# Patient Record
Sex: MALE | Race: WHITE | NOT HISPANIC OR LATINO | Employment: FULL TIME | URBAN - NONMETROPOLITAN AREA
[De-identification: names, ages, dates, MRNs, and addresses within clinical notes are randomized per-mention and may not be internally consistent; named-entity substitution may affect disease eponyms.]

---

## 2021-04-18 ENCOUNTER — HOSPITAL ENCOUNTER (EMERGENCY)
Facility: OTHER | Age: 51
Discharge: HOME | End: 2021-04-18
Attending: FAMILY MEDICINE | Admitting: FAMILY MEDICINE
Payer: COMMERCIAL

## 2021-04-18 VITALS
TEMPERATURE: 97.2 F | RESPIRATION RATE: 16 BRPM | SYSTOLIC BLOOD PRESSURE: 160 MMHG | DIASTOLIC BLOOD PRESSURE: 118 MMHG | HEIGHT: 71 IN | WEIGHT: 225 LBS | HEART RATE: 83 BPM | OXYGEN SATURATION: 98 % | BODY MASS INDEX: 31.5 KG/M2

## 2021-04-18 DIAGNOSIS — K08.89 PAIN, DENTAL: ICD-10-CM

## 2021-04-18 PROCEDURE — 99282 EMERGENCY DEPT VISIT SF MDM: CPT | Performed by: FAMILY MEDICINE

## 2021-04-18 PROCEDURE — 99283 EMERGENCY DEPT VISIT LOW MDM: CPT | Performed by: FAMILY MEDICINE

## 2021-04-18 RX ORDER — OXYCODONE AND ACETAMINOPHEN 5; 325 MG/1; MG/1
1-2 TABLET ORAL EVERY 4 HOURS PRN
Qty: 12 TABLET | Refills: 0 | Status: SHIPPED | OUTPATIENT
Start: 2021-04-18 | End: 2022-06-29

## 2021-04-18 RX ORDER — AMOXICILLIN 500 MG/1
500 CAPSULE ORAL 3 TIMES DAILY
Qty: 21 CAPSULE | Refills: 0 | Status: SHIPPED | OUTPATIENT
Start: 2021-04-18 | End: 2021-04-25

## 2021-04-18 ASSESSMENT — ENCOUNTER SYMPTOMS
CHILLS: 0
FEVER: 0

## 2021-04-18 ASSESSMENT — MIFFLIN-ST. JEOR: SCORE: 1892.72

## 2021-04-18 NOTE — ED TRIAGE NOTES
"ED Nursing Triage Note (General)   ________________________________    Alvaro MYRON Gaitan is a 50 year old Male that presents to triage private car, with history of dental pain to upper rt tooth.  reported by patient   Significant symptoms had onset x 2 days ago.   BP (!) 159/114   Pulse 77   Temp 97.2  F (36.2  C) (Tympanic)   Resp 16   Ht 1.803 m (5' 11\")   Wt 102.1 kg (225 lb)   SpO2 98%   BMI 31.38 kg/m  t  Patient appears alert  and oriented, in moderate distress., and cooperative and calm behavior.    GCS Total = 15  Airway: intact  Breathing noted as Normal.  Circulation Normal  Skin normal  Action taken:  Triage to critical care immediately      PRE HOSPITAL PRIOR LIVING SITUATION Spouse  "

## 2021-04-18 NOTE — ED PROVIDER NOTES
History     Chief Complaint   Patient presents with     Dental Pain     HPI  Alvaro Gaitan is a 50 year old male who -presents the ER with upper right dental pain.  Alvaro Gaitan is a 50 year old Male that presents to triage private car, with history of dental pain to upper rt tooth.  reported by patient   Significant symptoms had onset x 2 days ago.   Allergies:  Allergies   Allergen Reactions     Shellfish-Derived Products        Problem List:    There are no active problems to display for this patient.       Past Medical History:    Past Medical History:   Diagnosis Date     Atherosclerotic heart disease of native coronary artery without angina pectoris      Glaucoma      Hyperlipidemia      Male erectile dysfunction      Personal history of nicotine dependence        Past Surgical History:    Past Surgical History:   Procedure Laterality Date     OTHER SURGICAL HISTORY      [id number],OTHER     OTHER SURGICAL HISTORY      [id number],OTHER,  screws       Family History:    Family History   Problem Relation Age of Onset     Heart Disease Mother         Heart Disease     Heart Disease Father         Heart Disease     Hyperlipidemia Mother         Hyperlipidemia     Hyperlipidemia Father         Hyperlipidemia       Social History:  Marital Status:   [2]  Social History     Tobacco Use     Smoking status: Current Every Day Smoker     Packs/day: 0.50     Years: 25.00     Pack years: 12.50     Types: Cigarettes     Last attempt to quit: 2008     Years since quittin.1     Smokeless tobacco: Never Used     Tobacco comment: Quit smoking: Patient states that he smokes daily, but only one cigarettes   Substance Use Topics     Alcohol use: Yes     Alcohol/week: 8.3 standard drinks     Drug use: Unknown     Types: Other     Comment: Drug use: No        Medications:    amoxicillin (AMOXIL) 500 MG capsule  oxyCODONE-acetaminophen (PERCOCET) 5-325 MG tablet          Review of Systems   Constitutional: Negative for  "chills and fever.       Physical Exam   BP: (!) 159/114  Pulse: 77  Temp: 97.2  F (36.2  C)  Resp: 16  Height: 180.3 cm (5' 11\")  Weight: 102.1 kg (225 lb)  SpO2: 98 %      Physical Exam  Vitals signs and nursing note reviewed.   Constitutional:       Appearance: Normal appearance.   HENT:      Head:      Jaw: No trismus, swelling or malocclusion.   Neurological:      Mental Status: He is alert.     Very poor dentition    ED Course        Procedures    No results found for this or any previous visit (from the past 24 hour(s)).    Medications - No data to display    Assessments & Plan (with Medical Decision Making)     I have reviewed the nursing notes.    I have reviewed the findings, diagnosis, plan and need for follow up with the patient.    New Prescriptions    AMOXICILLIN (AMOXIL) 500 MG CAPSULE    Take 1 capsule (500 mg) by mouth 3 times daily for 7 days    OXYCODONE-ACETAMINOPHEN (PERCOCET) 5-325 MG TABLET    Take 1-2 tablets by mouth every 4 hours as needed for pain       Final diagnoses:   Pain, dental   Follow-up with dental ASAP    4[facility]18/2021   [facility]  04/18/21 1512    "

## 2021-04-18 NOTE — DISCHARGE INSTRUCTIONS
Please consider getting vaccinated to COVID-19 as soon as possible.  There are always small risks to any intervention or medication including the antibiotics and pain medications that you are receiving today.  As always however about weighing risk and benefit.  The benefit of receiving the COVID-19 vaccine is well-established to be far greater than the risk.  COVID-19 is a severe illness and although is often mild in many people I have personally witnessed severe disease and death in your age group.  If you are comfortable taking medications such as amoxicillin and Percocet both of which can cause severe side effects potentially I recommend being comfortable receiving the vaccine.

## 2021-11-22 ENCOUNTER — ALLIED HEALTH/NURSE VISIT (OUTPATIENT)
Dept: FAMILY MEDICINE | Facility: OTHER | Age: 51
End: 2021-11-22
Attending: FAMILY MEDICINE
Payer: COMMERCIAL

## 2021-11-22 DIAGNOSIS — R05.9 COUGH: Primary | ICD-10-CM

## 2021-11-22 DIAGNOSIS — R51.9 HEAD ACHE: ICD-10-CM

## 2021-11-22 PROCEDURE — U0003 INFECTIOUS AGENT DETECTION BY NUCLEIC ACID (DNA OR RNA); SEVERE ACUTE RESPIRATORY SYNDROME CORONAVIRUS 2 (SARS-COV-2) (CORONAVIRUS DISEASE [COVID-19]), AMPLIFIED PROBE TECHNIQUE, MAKING USE OF HIGH THROUGHPUT TECHNOLOGIES AS DESCRIBED BY CMS-2020-01-R: HCPCS | Mod: ZL

## 2021-11-22 PROCEDURE — C9803 HOPD COVID-19 SPEC COLLECT: HCPCS

## 2021-11-23 LAB — SARS-COV-2 RNA RESP QL NAA+PROBE: NEGATIVE

## 2021-12-04 ENCOUNTER — HEALTH MAINTENANCE LETTER (OUTPATIENT)
Age: 51
End: 2021-12-04

## 2022-06-29 ENCOUNTER — HOSPITAL ENCOUNTER (EMERGENCY)
Facility: OTHER | Age: 52
Discharge: HOME | End: 2022-06-29
Attending: PHYSICIAN ASSISTANT | Admitting: PHYSICIAN ASSISTANT
Payer: COMMERCIAL

## 2022-06-29 ENCOUNTER — APPOINTMENT (OUTPATIENT)
Dept: GENERAL RADIOLOGY | Facility: OTHER | Age: 52
End: 2022-06-29
Attending: PHYSICIAN ASSISTANT
Payer: COMMERCIAL

## 2022-06-29 VITALS
RESPIRATION RATE: 14 BRPM | DIASTOLIC BLOOD PRESSURE: 75 MMHG | HEIGHT: 71 IN | OXYGEN SATURATION: 94 % | TEMPERATURE: 100.8 F | WEIGHT: 230 LBS | SYSTOLIC BLOOD PRESSURE: 113 MMHG | BODY MASS INDEX: 32.2 KG/M2 | HEART RATE: 82 BPM

## 2022-06-29 DIAGNOSIS — R50.9 FEVER: ICD-10-CM

## 2022-06-29 DIAGNOSIS — R31.1 BENIGN ESSENTIAL MICROSCOPIC HEMATURIA: ICD-10-CM

## 2022-06-29 DIAGNOSIS — D69.6 THROMBOCYTOPENIA (H): ICD-10-CM

## 2022-06-29 LAB
ALBUMIN SERPL-MCNC: 4.1 G/DL (ref 3.5–5.7)
ALBUMIN UR-MCNC: 10 MG/DL
ALP SERPL-CCNC: 99 U/L (ref 34–104)
ALT SERPL W P-5'-P-CCNC: 33 U/L (ref 7–52)
ANION GAP SERPL CALCULATED.3IONS-SCNC: 8 MMOL/L (ref 3–14)
APPEARANCE UR: CLEAR
AST SERPL W P-5'-P-CCNC: 34 U/L (ref 13–39)
BASOPHILS # BLD MANUAL: 0 10E3/UL (ref 0–0.2)
BASOPHILS NFR BLD MANUAL: 0 %
BILIRUB SERPL-MCNC: 0.5 MG/DL (ref 0.3–1)
BILIRUB UR QL STRIP: NEGATIVE
BUN SERPL-MCNC: 6 MG/DL (ref 7–25)
CALCIUM SERPL-MCNC: 9.2 MG/DL (ref 8.6–10.3)
CHLORIDE BLD-SCNC: 100 MMOL/L (ref 98–107)
CO2 SERPL-SCNC: 26 MMOL/L (ref 21–31)
COLOR UR AUTO: YELLOW
CREAT SERPL-MCNC: 0.87 MG/DL (ref 0.7–1.3)
EOSINOPHIL # BLD MANUAL: 0 10E3/UL (ref 0–0.7)
EOSINOPHIL NFR BLD MANUAL: 0 %
ERYTHROCYTE [DISTWIDTH] IN BLOOD BY AUTOMATED COUNT: 13.7 % (ref 10–15)
FLUAV RNA SPEC QL NAA+PROBE: NEGATIVE
FLUBV RNA RESP QL NAA+PROBE: NEGATIVE
GFR SERPL CREATININE-BSD FRML MDRD: >90 ML/MIN/1.73M2
GLUCOSE BLD-MCNC: 96 MG/DL (ref 70–105)
GLUCOSE UR STRIP-MCNC: NEGATIVE MG/DL
GROUP A STREP BY PCR: NOT DETECTED
HCT VFR BLD AUTO: 48.3 % (ref 40–53)
HGB BLD-MCNC: 16.7 G/DL (ref 13.3–17.7)
HGB UR QL STRIP: ABNORMAL
KETONES UR STRIP-MCNC: NEGATIVE MG/DL
LACTATE SERPL-SCNC: 1.6 MMOL/L (ref 0.7–2)
LEUKOCYTE ESTERASE UR QL STRIP: NEGATIVE
LIPASE SERPL-CCNC: 18 U/L (ref 11–82)
LYMPHOCYTES # BLD MANUAL: 0.7 10E3/UL (ref 0.8–5.3)
LYMPHOCYTES NFR BLD MANUAL: 16 %
MAGNESIUM SERPL-MCNC: 1.7 MG/DL (ref 1.9–2.7)
MCH RBC QN AUTO: 31.2 PG (ref 26.5–33)
MCHC RBC AUTO-ENTMCNC: 34.6 G/DL (ref 31.5–36.5)
MCV RBC AUTO: 90 FL (ref 78–100)
MONOCYTES # BLD MANUAL: 0.2 10E3/UL (ref 0–1.3)
MONOCYTES NFR BLD MANUAL: 4 %
MUCOUS THREADS #/AREA URNS LPF: PRESENT /LPF
NEUTROPHILS # BLD MANUAL: 3.6 10E3/UL (ref 1.6–8.3)
NEUTROPHILS NFR BLD MANUAL: 80 %
NITRATE UR QL: NEGATIVE
PH UR STRIP: 6 [PH] (ref 5–9)
PLAT MORPH BLD: ABNORMAL
PLATELET # BLD AUTO: 134 10E3/UL (ref 150–450)
POTASSIUM BLD-SCNC: 3.9 MMOL/L (ref 3.5–5.1)
PROT SERPL-MCNC: 6.9 G/DL (ref 6.4–8.9)
RBC # BLD AUTO: 5.36 10E6/UL (ref 4.4–5.9)
RBC MORPH BLD: ABNORMAL
RBC URINE: 4 /HPF
RSV RNA SPEC NAA+PROBE: NEGATIVE
SARS-COV-2 RNA RESP QL NAA+PROBE: NEGATIVE
SODIUM SERPL-SCNC: 134 MMOL/L (ref 134–144)
SP GR UR STRIP: 1.02 (ref 1–1.03)
TSH SERPL DL<=0.005 MIU/L-ACNC: 1.74 MU/L (ref 0.4–4)
UROBILINOGEN UR STRIP-MCNC: 4 MG/DL
WBC # BLD AUTO: 4.5 10E3/UL (ref 4–11)
WBC URINE: 1 /HPF

## 2022-06-29 PROCEDURE — 87637 SARSCOV2&INF A&B&RSV AMP PRB: CPT | Performed by: PHYSICIAN ASSISTANT

## 2022-06-29 PROCEDURE — 85007 BL SMEAR W/DIFF WBC COUNT: CPT | Performed by: PHYSICIAN ASSISTANT

## 2022-06-29 PROCEDURE — 96365 THER/PROPH/DIAG IV INF INIT: CPT | Performed by: PHYSICIAN ASSISTANT

## 2022-06-29 PROCEDURE — 83605 ASSAY OF LACTIC ACID: CPT | Performed by: PHYSICIAN ASSISTANT

## 2022-06-29 PROCEDURE — 87798 DETECT AGENT NOS DNA AMP: CPT | Performed by: PHYSICIAN ASSISTANT

## 2022-06-29 PROCEDURE — 71045 X-RAY EXAM CHEST 1 VIEW: CPT

## 2022-06-29 PROCEDURE — 83690 ASSAY OF LIPASE: CPT | Performed by: PHYSICIAN ASSISTANT

## 2022-06-29 PROCEDURE — 82310 ASSAY OF CALCIUM: CPT | Performed by: PHYSICIAN ASSISTANT

## 2022-06-29 PROCEDURE — 85027 COMPLETE CBC AUTOMATED: CPT | Performed by: PHYSICIAN ASSISTANT

## 2022-06-29 PROCEDURE — 86618 LYME DISEASE ANTIBODY: CPT | Performed by: PHYSICIAN ASSISTANT

## 2022-06-29 PROCEDURE — 250N000011 HC RX IP 250 OP 636: Performed by: PHYSICIAN ASSISTANT

## 2022-06-29 PROCEDURE — 250N000013 HC RX MED GY IP 250 OP 250 PS 637: Performed by: PHYSICIAN ASSISTANT

## 2022-06-29 PROCEDURE — C9803 HOPD COVID-19 SPEC COLLECT: HCPCS | Performed by: PHYSICIAN ASSISTANT

## 2022-06-29 PROCEDURE — 36415 COLL VENOUS BLD VENIPUNCTURE: CPT | Performed by: PHYSICIAN ASSISTANT

## 2022-06-29 PROCEDURE — 84443 ASSAY THYROID STIM HORMONE: CPT | Performed by: PHYSICIAN ASSISTANT

## 2022-06-29 PROCEDURE — 99284 EMERGENCY DEPT VISIT MOD MDM: CPT | Mod: CS | Performed by: PHYSICIAN ASSISTANT

## 2022-06-29 PROCEDURE — 87651 STREP A DNA AMP PROBE: CPT | Performed by: PHYSICIAN ASSISTANT

## 2022-06-29 PROCEDURE — 87040 BLOOD CULTURE FOR BACTERIA: CPT | Performed by: PHYSICIAN ASSISTANT

## 2022-06-29 PROCEDURE — 99285 EMERGENCY DEPT VISIT HI MDM: CPT | Mod: 25,CS | Performed by: PHYSICIAN ASSISTANT

## 2022-06-29 PROCEDURE — 258N000003 HC RX IP 258 OP 636: Performed by: PHYSICIAN ASSISTANT

## 2022-06-29 PROCEDURE — 81001 URINALYSIS AUTO W/SCOPE: CPT | Performed by: PHYSICIAN ASSISTANT

## 2022-06-29 PROCEDURE — 83735 ASSAY OF MAGNESIUM: CPT | Performed by: PHYSICIAN ASSISTANT

## 2022-06-29 RX ORDER — NITROGLYCERIN 0.4 MG/1
TABLET SUBLINGUAL
COMMUNITY
Start: 2022-06-27 | End: 2023-09-18

## 2022-06-29 RX ORDER — ISOSORBIDE MONONITRATE 60 MG/1
60 TABLET, EXTENDED RELEASE ORAL DAILY
COMMUNITY
Start: 2022-06-21 | End: 2023-09-18

## 2022-06-29 RX ORDER — ATORVASTATIN CALCIUM 40 MG/1
40 TABLET, FILM COATED ORAL DAILY
COMMUNITY
Start: 2022-06-21 | End: 2023-09-18

## 2022-06-29 RX ORDER — DOXYCYCLINE 100 MG/10ML
100 INJECTION, POWDER, LYOPHILIZED, FOR SOLUTION INTRAVENOUS EVERY 12 HOURS
Status: DISCONTINUED | OUTPATIENT
Start: 2022-06-29 | End: 2022-06-30 | Stop reason: HOSPADM

## 2022-06-29 RX ORDER — ASPIRIN 81 MG/1
81 TABLET, COATED ORAL DAILY
COMMUNITY
Start: 2022-06-21 | End: 2023-09-18

## 2022-06-29 RX ORDER — LISINOPRIL 2.5 MG/1
2.5 TABLET ORAL DAILY
COMMUNITY
Start: 2022-06-21 | End: 2023-09-18

## 2022-06-29 RX ORDER — ACETAMINOPHEN 500 MG
1000 TABLET ORAL ONCE
Status: COMPLETED | OUTPATIENT
Start: 2022-06-29 | End: 2022-06-29

## 2022-06-29 RX ORDER — DOXYCYCLINE 100 MG/1
100 CAPSULE ORAL 2 TIMES DAILY
Qty: 28 CAPSULE | Refills: 0 | Status: SHIPPED | OUTPATIENT
Start: 2022-06-29 | End: 2023-11-16

## 2022-06-29 RX ORDER — CLOPIDOGREL BISULFATE 75 MG/1
75 TABLET ORAL DAILY
COMMUNITY
Start: 2022-06-21 | End: 2023-09-18

## 2022-06-29 RX ADMIN — SODIUM CHLORIDE 1000 ML: 9 INJECTION, SOLUTION INTRAVENOUS at 20:50

## 2022-06-29 RX ADMIN — DOXYCYCLINE 100 MG: 100 INJECTION, POWDER, LYOPHILIZED, FOR SOLUTION INTRAVENOUS at 22:47

## 2022-06-29 RX ADMIN — ACETAMINOPHEN 1000 MG: 500 TABLET ORAL at 20:50

## 2022-06-29 ASSESSMENT — ENCOUNTER SYMPTOMS
FATIGUE: 1
VOMITING: 0
HEADACHES: 1
ABDOMINAL PAIN: 0
FEVER: 1
SORE THROAT: 1
ARTHRALGIAS: 1
SINUS PAIN: 0
CONFUSION: 0
EYE PAIN: 0
HEMATURIA: 0
NAUSEA: 0
DYSURIA: 0
NECK PAIN: 0
BACK PAIN: 1
CHILLS: 1
BRUISES/BLEEDS EASILY: 0
SHORTNESS OF BREATH: 0
LIGHT-HEADEDNESS: 0
COUGH: 0
DIARRHEA: 0

## 2022-06-29 NOTE — ED TRIAGE NOTES
Pt comes into the ER today with his wife. Pt reports that he is shaking and sweating off and on since yesterday. He doesn't feel well has lower back pain, right knee pain. Reports no cough no shortness of breath, no nausea vomiting diarrhea. Reports no urinary problems. Intake has been ok. May have had a tick bite about a week ago down in [place]. No rash.  Pt has some nasal congestion and a scratchy throat.      Triage Assessment     Row Name 06/29/22 0454       Triage Assessment (Adult)    Airway WDL WDL       Respiratory WDL    Respiratory WDL WDL       Skin Circulation/Temperature WDL    Skin Circulation/Temperature WDL WDL       Cardiac WDL    Cardiac WDL WDL       Peripheral/Neurovascular WDL    Peripheral Neurovascular WDL WDL    Capillary Refill, General less than/equal to 3 secs       [place] Coma Scale    Best Eye Response 4-->(E4) spontaneous    Best Motor Response 6-->(M6) obeys commands    Best Verbal Response 5-->(V5) oriented    [place] Coma Scale Score 15

## 2022-06-30 LAB — B BURGDOR IGG+IGM SER QL: 0.45

## 2022-06-30 NOTE — DISCHARGE INSTRUCTIONS
-Unknown to as what is causing your symptoms.  We will treat you for Lyme disease until Lyme disease test results.  This will take doxycycline 100 mg with food every 12 hours for 14 days.  If Lyme disease test comes back negative, you may discontinue doxycycline.    -I would recommend following up with your primary care doctor in about 1 week to retest your urine for blood.  If this is a viral illness, this hematuria should resolve.  There is no signs of bladder infection on today's exam.  If you have continuing back pain and hematuria, would recommend following up with your primary care doctor for CT scan of your abdomen and pelvis, and I was also recommend following up with urology to further investigate the blood in your urine.

## 2022-06-30 NOTE — ED PROVIDER NOTES
EMERGENCY DEPARTMENT ENCOUNTER      NAME: Alvaro Gaitan  AGE: 51 year old male  YOB: 1970  MRN: [id number]  EVALUATION DATE & TIME: 6/29/2022  7:44 PM    PCP: No Ref-Primary, Physician    ED PROVIDER: Andrew Kumar PA-C      CHIEF COMPLAINT:  Chief Complaint   Patient presents with     Chills     Sweats         FINAL IMPRESSION:  1. Fever    2. Thrombocytopenia (H)    3. Benign essential microscopic hematuria        ED COURSE, MEDICAL DECISION MAKING, ASSESSMENT, AND PLAN:    Pertinent Labs & Imaging studies reviewed. (See chart for details)  Results for orders placed or performed during the hospital encounter of 06/29/22   XR Chest Port 1 View    Impression    IMPRESSION: Clear chest    DAQUAN STEVENSON MD         SYSTEM ID:  [id number]   Comprehensive metabolic panel   Result Value Ref Range    Sodium 134 134 - 144 mmol/L    Potassium 3.9 3.5 - 5.1 mmol/L    Chloride 100 98 - 107 mmol/L    Carbon Dioxide (CO2) 26 21 - 31 mmol/L    Anion Gap 8 3 - 14 mmol/L    Urea Nitrogen 6 (L) 7 - 25 mg/dL    Creatinine 0.87 0.70 - 1.30 mg/dL    Calcium 9.2 8.6 - 10.3 mg/dL    Glucose 96 70 - 105 mg/dL    Alkaline Phosphatase 99 34 - 104 U/L    AST 34 13 - 39 U/L    ALT 33 7 - 52 U/L    Protein Total 6.9 6.4 - 8.9 g/dL    Albumin 4.1 3.5 - 5.7 g/dL    Bilirubin Total 0.5 0.3 - 1.0 mg/dL    GFR Estimate >90 >60 mL/min/1.73m2   Lactic acid whole blood   Result Value Ref Range    Lactic Acid 1.6 0.7 - 2.0 mmol/L   Result Value Ref Range    Lipase 18 11 - 82 U/L   Result Value Ref Range    Magnesium 1.7 (L) 1.9 - 2.7 mg/dL   Thyrotropin GH   Result Value Ref Range    TSH 1.74 0.40 - 4.00 mU/L   UA with Microscopic reflex to Culture    Specimen: Urine, Clean Catch   Result Value Ref Range    Color Urine Yellow Colorless, Straw, Light Yellow, Yellow    Appearance Urine Clear Clear    Glucose Urine Negative Negative mg/dL    Bilirubin Urine Negative Negative    Ketones Urine Negative Negative mg/dL     "Specific Gravity Urine 1.020 1.000 - 1.030    Blood Urine Small (A) Negative    pH Urine 6.0 5.0 - 9.0    Protein Albumin Urine 10  (A) Negative mg/dL    Urobilinogen Urine 4.0 (A) Normal, 2.0 mg/dL    Nitrite Urine Negative Negative    Leukocyte Esterase Urine Negative Negative    Mucus Urine Present (A) None Seen /LPF    RBC Urine 4 (H) <=2 /HPF    WBC Urine 1 <=5 /HPF   Asymptomatic Influenza A/B & SARS-CoV2 (COVID-19) Virus PCR Multiplex Nose    Specimen: Nose; Swab   Result Value Ref Range    Influenza A PCR Negative Negative    Influenza B PCR Negative Negative    RSV PCR Negative Negative    SARS CoV2 PCR Negative Negative   CBC with platelets and differential   Result Value Ref Range    WBC Count 4.5 4.0 - 11.0 10e3/uL    RBC Count 5.36 4.40 - 5.90 10e6/uL    Hemoglobin 16.7 13.3 - 17.7 g/dL    Hematocrit 48.3 40.0 - 53.0 %    MCV 90 78 - 100 fL    MCH 31.2 26.5 - 33.0 pg    MCHC 34.6 31.5 - 36.5 g/dL    RDW 13.7 10.0 - 15.0 %    Platelet Count 134 (L) 150 - 450 10e3/uL   Manual Differential   Result Value Ref Range    % Neutrophils 80 %    % Lymphocytes 16 %    % Monocytes 4 %    % Eosinophils 0 %    % Basophils 0 %    Absolute Neutrophils 3.6 1.6 - 8.3 10e3/uL    Absolute Lymphocytes 0.7 (L) 0.8 - 5.3 10e3/uL    Absolute Monocytes 0.2 0.0 - 1.3 10e3/uL    Absolute Eosinophils 0.0 0.0 - 0.7 10e3/uL    Absolute Basophils 0.0 0.0 - 0.2 10e3/uL    RBC Morphology Confirmed RBC Indices     Platelet Assessment  Automated Count Confirmed. Platelet morphology is normal.     Automated Count Confirmed. Platelet morphology is normal.   Group A Streptococcus PCR Throat Swab    Specimen: Throat; Swab   Result Value Ref Range    Group A strep by PCR Not Detected Not Detected     No results found for: ABORH      The patient was interviewed and examined. History in the chart was reviewed.  Vitals and triage note were reviewed /58   Pulse 91   Temp (!) 100.8  F (38.2  C) (Tympanic)   Resp 14   Ht 1.803 m (5' 11\")  "  Wt 104.3 kg (230 lb)   SpO2 96%   BMI 32.08 kg/m      PPE worn during patient evaluation:  Mask: Yes, surgical  Eye Protection: No  Gown: No  Hair cover: No  Face Shield: No  Patient wearing a mask: yes    General History/Differential Dx:  Alvaro Gaitan is a pleasant 51 [facility].    Patient has been having fever and chills especially at night over the past 2 to 3 days.  Patient has been chilled so much that he has been shaking a lot.  States that he does have some mild congestion, headache, stuffy nose, and scratchy throat.  He states that he has been exposed to tick bites.  Denies any history of Lyme disease.  Patient having some minimal lower back pain and right knee pain but denies any urinary symptoms.  No constipation diarrhea.  No bloody or melanotic stools.  No joint swelling.  No rashes, erythema, or warmth of any of the upper or lower extremities.  No chest pain, shortness of breath, or dyspnea.    Vitals and Exam:  Patient was febrile temperature 100.8.  Patient's not tachycardic or hypoxic.  Patient was otherwise hemodynamically stable.  Patient was ill-appearing but not toxic.  He was alert and orientated.  Patient was in no acute distress.  HEENT today was negative.  Lungs were clear.  Heart regular without murmurs, gallops, rubs.  No meningeal signs.  No abdominal, flank, or CVA tenderness.  No back tenderness.  No tenderness of the upper or lower extremities.  No signs of septic joint or cellulitis of the upper or lower extremities.  Exam today was otherwise benign.  Blood cultures pending.  Anaplasmosis test pending.  Lyme disease test pending    Labs, EKG, and Imaging:  Platelet count 134.  Magnesium 1.7.  UA shows hematuria without signs of infection.  No leukocytosis or neutrophilia.  Lactic acid was normal.  No renal dysfunction.  No hepatic dysfunction.  Magnesium 1.7, otherwise no electrolyte abnormalities.  Chest x-ray  negative.  CT abdomen pelvis was offered but patient declined at this visit as he was feeling better after IV fluids and Tylenol.    Medications, Interventions, Reassessments, & Response to Treatments:  Patient given 1 L of normal saline and oral Tylenol 1000 mg with patient feeling better.  Patient was given IV doxycycline 100 mg to cover for possible Lyme disease.    Overall Impression/Treatment Plan/Discharge Info/Follow-up/Medical Necessity for Admission:  Fever, thrombocytopenia, and hematuria  -Unknown to as the cause.  Chest x-ray negative with no signs of pneumonia.  No meningeal signs to suggest meningitis.  No leukocytosis.  UA shows hematuria without signs of infection.  Patient did have thrombocytopenia.  -Broad differential considered.  Lyme disease still possible which would explain thrombocytopenia.  We will start patient on doxycycline 100 mg every 12 hours for 14 days until Lyme disease test results.  Blood cultures and anaplasmosis smear still pending.  Patient was complaining of some mild lower back pain with noted hematuria.  No signs of UTI to suggest pyelonephritis.  Patient was offered CT at this point but declined and states that he would follow-up with his primary care doctor and if he continues to have hematuria, will do CT as an outpatient.  Patient states that he would also return to the ER for severe worsening symptoms.  -Recommend following up with his primary care doctor in 1 week to retest platelet count and urine.  If patient continues to have hematuria with no signs of infection, CT imaging of the abdomen pelvis would be beneficial as well as urology referral for hematuria work-up.    A shared decision making model was used. Plan and all results were discussed  Time was taken to answer all questions. Patient and/or associated parties understood and were agreeable to treatment plan.  Warning signs and close return precautions to return to the ED given. Discharged with discharge  instructions outlining plan for further care and follow up.  Copy of results given.       MEDICATIONS GIVEN IN THE EMERGENCY:  Medications   doxycycline (VIBRAMYCIN) 100 mg vial to attach to  mL bag (100 mg Intravenous New Bag 6/29/22 2247)   0.9% sodium chloride BOLUS (0 mLs Intravenous Stopped 6/29/22 2151)   acetaminophen (TYLENOL) tablet 1,000 mg (1,000 mg Oral Given 6/29/22 2050)       NEW PRESCRIPTIONS STARTED AT TODAY'S ER VISIT:  New Prescriptions    DOXYCYCLINE MONOHYDRATE (MONODOX) 100 MG CAPSULE    Take 1 capsule (100 mg) by mouth 2 times daily          =================================================================    HPI  Alvaro Gaitan is a pleasant 51 year old male who presents to the ER tonight complaining of fever and chills.    Patient has been having fever and chills especially at night over the past 2 to 3 days.  Patient has been chilled so much that he has been shaking a lot.  States that he does have some mild congestion, headache, stuffy nose, and scratchy throat.  He states that he has been exposed to tick bites.  Denies any history of Lyme disease.  Patient having some minimal lower back pain and right knee pain but denies any urinary symptoms.  No constipation diarrhea.  No bloody or melanotic stools.  No joint swelling.  No rashes, erythema, or warmth of any of the upper or lower extremities.  No chest pain, shortness of breath, or dyspnea.      REVIEW OF SYSTEMS   Review of Systems   Constitutional: Positive for chills, fatigue and fever.   HENT: Positive for congestion and sore throat. Negative for ear pain and sinus pain.    Eyes: Negative for pain.   Respiratory: Negative for cough and shortness of breath.    Cardiovascular: Negative for chest pain.   Gastrointestinal: Negative for abdominal pain, diarrhea, nausea and vomiting.   Genitourinary: Negative for dysuria and hematuria.   Musculoskeletal: Positive for arthralgias (Minimal right knee pain.) and back pain (Minimal back  pain today.). Negative for neck pain.   Skin: Negative for rash.   Allergic/Immunologic: Negative for immunocompromised state.   Neurological: Positive for headaches. Negative for light-headedness.   Hematological: Does not bruise/bleed easily.   Psychiatric/Behavioral: Negative for confusion.       Remainder of systems reviewed, unless noted in HPI all others negative.      PAST MEDICAL HISTORY:  Past Medical History:   Diagnosis Date     Atherosclerotic heart disease of native coronary artery without angina pectoris     2/4/2008,s/p stent     Glaucoma     No Comments Provided     Hyperlipidemia     2/4/2008     Male erectile dysfunction     No Comments Provided     Personal history of nicotine dependence     2/5/2008       PAST SURGICAL HISTORY:  Past Surgical History:   Procedure Laterality Date     OTHER SURGICAL HISTORY      [id number],OTHER     OTHER SURGICAL HISTORY      [id number],OTHER,  screws           CURRENT MEDICATIONS:    Current Outpatient Medications   Medication Instructions     Aspirin Low Dose 81 mg, Oral, DAILY     atorvastatin (LIPITOR) 40 mg, Oral, DAILY     clopidogrel (PLAVIX) 75 mg, Oral, DAILY     doxycycline monohydrate (MONODOX) 100 mg, Oral, 2 TIMES DAILY     isosorbide mononitrate (IMDUR) 60 mg, Oral, DAILY     lisinopril (ZESTRIL) 2.5 mg, Oral, DAILY     nitroGLYcerin (NITROSTAT) 0.4 MG sublingual tablet No dose, route, or frequency recorded.       ALLERGIES:  Allergies   Allergen Reactions     Shellfish-Derived Products        FAMILY HISTORY:  Family History   Problem Relation Age of Onset     Heart Disease Mother         Heart Disease     Heart Disease Father         Heart Disease     Hyperlipidemia Mother         Hyperlipidemia     Hyperlipidemia Father         Hyperlipidemia       SOCIAL HISTORY:   Social History     Socioeconomic History     Marital status:    Tobacco Use     Smoking status: Current Every Day Smoker     Packs/day: 0.50     Years: 25.00     Pack years: 12.50      "Types: Cigarettes     Last attempt to quit: 2008     Years since quittin.3     Smokeless tobacco: Never Used     Tobacco comment: Quit smoking: Patient states that he smokes daily, but only one cigarettes   Substance and Sexual Activity     Alcohol use: Yes     Alcohol/week: 8.3 standard drinks     Drug use: Unknown     Types: Other     Comment: Drug use: No   Social History Narrative     work in [employer]  2 children       PHYSICAL EXAM    VITAL SIGNS: /58   Pulse 91   Temp (!) 100.8  F (38.2  C) (Tympanic)   Resp 14   Ht 1.803 m (5' 11\")   Wt 104.3 kg (230 lb)   SpO2 96%   BMI 32.08 kg/m      Patient Vitals for the past 24 hrs:   BP Temp Temp src Pulse Resp SpO2 Height Weight   22 2245 -- -- -- -- -- 96 % -- --   22 2215 -- -- -- -- -- 95 % -- --   22 2200 112/58 -- -- 91 -- 96 % -- --   22 2130 110/75 -- -- 94 -- 97 % -- --   22 2100 119/79 -- -- 96 -- 98 % -- --   22 2050 128/78 (!) 100.8  F (38.2  C) Tympanic 97 14 98 % -- --   22 1851 135/85 98.8  F (37.1  C) Tympanic 92 18 99 % 1.803 m (5' 11\") 104.3 kg (230 lb)       Physical Exam  Vitals and nursing note reviewed.   Constitutional:       General: He is not in acute distress.     Appearance: Normal appearance. He is normal weight. He is ill-appearing. He is not toxic-appearing.   HENT:      Head: Normocephalic.      Right Ear: Tympanic membrane, ear canal and external ear normal.      Left Ear: Tympanic membrane, ear canal and external ear normal.      Nose: Nose normal.      Mouth/Throat:      Mouth: Mucous membranes are moist.      Pharynx: Oropharynx is clear.   Eyes:      Extraocular Movements: Extraocular movements intact.      Conjunctiva/sclera: Conjunctivae normal.      Pupils: Pupils are equal, round, and reactive to light.   Cardiovascular:      Rate and Rhythm: Normal rate.      Pulses: Normal pulses.      Heart sounds: Normal heart sounds. No murmur heard.    No " friction rub. No gallop.   Pulmonary:      Effort: Pulmonary effort is normal.      Breath sounds: Normal breath sounds. No wheezing, rhonchi or rales.   Chest:      Chest wall: No tenderness.   Abdominal:      General: Abdomen is flat. Bowel sounds are normal.      Palpations: Abdomen is soft.      Tenderness: There is no abdominal tenderness. There is no right CVA tenderness, left CVA tenderness, guarding or rebound.   Musculoskeletal:         General: Normal range of motion.      Cervical back: Normal range of motion and neck supple. No rigidity or tenderness.      Right lower leg: No edema.      Left lower leg: No edema.      Comments: No midline cervical, thoracic, or lumbar spine tenderness.  No paraspinal muscle tenderness.  Evaluation of the knees today revealed no effusion or swelling.  No erythema or warmth.  Full active range of motion of bilateral knees.  Patient can ambulate without tenderness or limping. NVI   Skin:     General: Skin is warm and dry.      Capillary Refill: Capillary refill takes less than 2 seconds.      Coloration: Skin is not jaundiced.      Findings: No rash.   Neurological:      General: No focal deficit present.      Mental Status: He is alert and oriented to person, place, and time.   Psychiatric:         Mood and Affect: Mood normal.         Behavior: Behavior normal.         Thought Content: Thought content normal.            LAB:  All pertinent labs reviewed and interpreted.  Results for orders placed or performed during the hospital encounter of 06/29/22   XR Chest Port 1 View    Impression    IMPRESSION: Clear chest    DAQUAN STEVENSON MD         SYSTEM ID:  [id number]   Comprehensive metabolic panel   Result Value Ref Range    Sodium 134 134 - 144 mmol/L    Potassium 3.9 3.5 - 5.1 mmol/L    Chloride 100 98 - 107 mmol/L    Carbon Dioxide (CO2) 26 21 - 31 mmol/L    Anion Gap 8 3 - 14 mmol/L    Urea Nitrogen 6 (L) 7 - 25 mg/dL    Creatinine 0.87 0.70 - 1.30 mg/dL    Calcium 9.2  8.6 - 10.3 mg/dL    Glucose 96 70 - 105 mg/dL    Alkaline Phosphatase 99 34 - 104 U/L    AST 34 13 - 39 U/L    ALT 33 7 - 52 U/L    Protein Total 6.9 6.4 - 8.9 g/dL    Albumin 4.1 3.5 - 5.7 g/dL    Bilirubin Total 0.5 0.3 - 1.0 mg/dL    GFR Estimate >90 >60 mL/min/1.73m2   Lactic acid whole blood   Result Value Ref Range    Lactic Acid 1.6 0.7 - 2.0 mmol/L   Result Value Ref Range    Lipase 18 11 - 82 U/L   Result Value Ref Range    Magnesium 1.7 (L) 1.9 - 2.7 mg/dL   Thyrotropin GH   Result Value Ref Range    TSH 1.74 0.40 - 4.00 mU/L   UA with Microscopic reflex to Culture    Specimen: Urine, Clean Catch   Result Value Ref Range    Color Urine Yellow Colorless, Straw, Light Yellow, Yellow    Appearance Urine Clear Clear    Glucose Urine Negative Negative mg/dL    Bilirubin Urine Negative Negative    Ketones Urine Negative Negative mg/dL    Specific Gravity Urine 1.020 1.000 - 1.030    Blood Urine Small (A) Negative    pH Urine 6.0 5.0 - 9.0    Protein Albumin Urine 10  (A) Negative mg/dL    Urobilinogen Urine 4.0 (A) Normal, 2.0 mg/dL    Nitrite Urine Negative Negative    Leukocyte Esterase Urine Negative Negative    Mucus Urine Present (A) None Seen /LPF    RBC Urine 4 (H) <=2 /HPF    WBC Urine 1 <=5 /HPF   Asymptomatic Influenza A/B & SARS-CoV2 (COVID-19) Virus PCR Multiplex Nose    Specimen: Nose; Swab   Result Value Ref Range    Influenza A PCR Negative Negative    Influenza B PCR Negative Negative    RSV PCR Negative Negative    SARS CoV2 PCR Negative Negative   CBC with platelets and differential   Result Value Ref Range    WBC Count 4.5 4.0 - 11.0 10e3/uL    RBC Count 5.36 4.40 - 5.90 10e6/uL    Hemoglobin 16.7 13.3 - 17.7 g/dL    Hematocrit 48.3 40.0 - 53.0 %    MCV 90 78 - 100 fL    MCH 31.2 26.5 - 33.0 pg    MCHC 34.6 31.5 - 36.5 g/dL    RDW 13.7 10.0 - 15.0 %    Platelet Count 134 (L) 150 - 450 10e3/uL   Manual Differential   Result Value Ref Range    % Neutrophils 80 %    % Lymphocytes 16 %    %  Monocytes 4 %    % Eosinophils 0 %    % Basophils 0 %    Absolute Neutrophils 3.6 1.6 - 8.3 10e3/uL    Absolute Lymphocytes 0.7 (L) 0.8 - 5.3 10e3/uL    Absolute Monocytes 0.2 0.0 - 1.3 10e3/uL    Absolute Eosinophils 0.0 0.0 - 0.7 10e3/uL    Absolute Basophils 0.0 0.0 - 0.2 10e3/uL    RBC Morphology Confirmed RBC Indices     Platelet Assessment  Automated Count Confirmed. Platelet morphology is normal.     Automated Count Confirmed. Platelet morphology is normal.   Group A Streptococcus PCR Throat Swab    Specimen: Throat; Swab   Result Value Ref Range    Group A strep by PCR Not Detected Not Detected       RADIOLOGY:  Reviewed all pertinent imaging. Please see official radiology report.  XR Chest Port 1 View   Final Result   IMPRESSION: Clear chest      DAQUAN STEVENSON MD            SYSTEM ID:  [id number]            IVini PA-C, personally performed the services described in this documentation, and it is both accurate and complete.     Andrew Kumar PA-C  06/29/22 [id number]

## 2022-07-03 ENCOUNTER — TELEPHONE (OUTPATIENT)
Dept: EMERGENCY MEDICINE | Facility: OTHER | Age: 52
End: 2022-07-03

## 2022-07-03 LAB
A PHAGOCYTOPH DNA BLD QL NAA+PROBE: NOT DETECTED
E CHAFFEENSIS DNA BLD QL NAA+PROBE: DETECTED
E EWINGII DNA SPEC QL NAA+PROBE: NOT DETECTED
EHRLICHIA DNA SPEC QL NAA+PROBE: NOT DETECTED

## 2022-07-03 NOTE — TELEPHONE ENCOUNTER
[facility] Emergency Department/Urgent Care Lab result notification:    Reason for call  Notify of lab results, assess symptoms,  review ED providers recommendations (if necessary) and advise per ED lab result f/u protocol.    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  [facility] Emergency Dept discharge antibiotic prescribed [if applicable]: Doxycycline 100 mg PO tablet, 1 tablet (100 mg) by mouth 2 times daily for 14 days  Recommendations in treatment per [facility] ED Lab Result Anaplasmosis/Ehrlichiosis protocol.  11:24a  Spoke with patient and relayed results to patient. Encouraged follow up as directed with his pcp. Patient stated understanding and he did not have any concerns or questions at this time.  He will continue taking the doxycycline as directed.  Referenced the following information for Ehrlichia from this source RN protocols and reference guide.  Left voicemail message requesting a call back to [phone] between 9 a.m. and 5:30 p.m. for patient's ED/UC lab results.      Sada Brown, RN  [facility] Result RN  [facility] Emergency Dept Lab Result RN  Ph# [phone]

## 2022-07-04 LAB
BACTERIA BLD CULT: NO GROWTH
BACTERIA BLD CULT: NO GROWTH

## 2022-07-11 ENCOUNTER — OFFICE VISIT (OUTPATIENT)
Dept: FAMILY MEDICINE | Facility: OTHER | Age: 52
End: 2022-07-11
Attending: FAMILY MEDICINE
Payer: COMMERCIAL

## 2022-07-11 VITALS
DIASTOLIC BLOOD PRESSURE: 76 MMHG | RESPIRATION RATE: 20 BRPM | HEART RATE: 79 BPM | OXYGEN SATURATION: 97 % | TEMPERATURE: 97.7 F | WEIGHT: 222 LBS | BODY MASS INDEX: 30.96 KG/M2 | SYSTOLIC BLOOD PRESSURE: 118 MMHG

## 2022-07-11 DIAGNOSIS — R31.1 BENIGN ESSENTIAL MICROSCOPIC HEMATURIA: ICD-10-CM

## 2022-07-11 DIAGNOSIS — D69.6 THROMBOCYTOPENIA (H): ICD-10-CM

## 2022-07-11 LAB
ALBUMIN UR-MCNC: NEGATIVE MG/DL
APPEARANCE UR: CLEAR
BASOPHILS # BLD MANUAL: 0 10E3/UL (ref 0–0.2)
BASOPHILS NFR BLD MANUAL: 0 %
BILIRUB UR QL STRIP: NEGATIVE
COLOR UR AUTO: YELLOW
EOSINOPHIL # BLD MANUAL: 0 10E3/UL (ref 0–0.7)
EOSINOPHIL NFR BLD MANUAL: 0 %
ERYTHROCYTE [DISTWIDTH] IN BLOOD BY AUTOMATED COUNT: 14 % (ref 10–15)
GLUCOSE UR STRIP-MCNC: NEGATIVE MG/DL
HCT VFR BLD AUTO: 45.7 % (ref 40–53)
HGB BLD-MCNC: 15.4 G/DL (ref 13.3–17.7)
HGB UR QL STRIP: NEGATIVE
HOLD SPECIMEN: NORMAL
KETONES UR STRIP-MCNC: NEGATIVE MG/DL
LEUKOCYTE ESTERASE UR QL STRIP: NEGATIVE
LYMPHOCYTES # BLD MANUAL: 5.8 10E3/UL (ref 0.8–5.3)
LYMPHOCYTES NFR BLD MANUAL: 47 %
MCH RBC QN AUTO: 30.7 PG (ref 26.5–33)
MCHC RBC AUTO-ENTMCNC: 33.7 G/DL (ref 31.5–36.5)
MCV RBC AUTO: 91 FL (ref 78–100)
MONOCYTES # BLD MANUAL: 0.9 10E3/UL (ref 0–1.3)
MONOCYTES NFR BLD MANUAL: 7 %
NEUTROPHILS # BLD MANUAL: 5.7 10E3/UL (ref 1.6–8.3)
NEUTROPHILS NFR BLD MANUAL: 46 %
NITRATE UR QL: NEGATIVE
PH UR STRIP: 6 [PH] (ref 5–9)
PLAT MORPH BLD: ABNORMAL
PLATELET # BLD AUTO: 362 10E3/UL (ref 150–450)
RBC # BLD AUTO: 5.02 10E6/UL (ref 4.4–5.9)
RBC MORPH BLD: ABNORMAL
SP GR UR STRIP: 1.01 (ref 1–1.03)
UROBILINOGEN UR STRIP-MCNC: NORMAL MG/DL
VARIANT LYMPHS BLD QL SMEAR: PRESENT
WBC # BLD AUTO: 12.4 10E3/UL (ref 4–11)

## 2022-07-11 PROCEDURE — 99203 OFFICE O/P NEW LOW 30 MIN: CPT | Performed by: FAMILY MEDICINE

## 2022-07-11 PROCEDURE — 85007 BL SMEAR W/DIFF WBC COUNT: CPT | Mod: ZL | Performed by: FAMILY MEDICINE

## 2022-07-11 PROCEDURE — 85027 COMPLETE CBC AUTOMATED: CPT | Mod: ZL | Performed by: FAMILY MEDICINE

## 2022-07-11 PROCEDURE — 81003 URINALYSIS AUTO W/O SCOPE: CPT | Mod: ZL | Performed by: FAMILY MEDICINE

## 2022-07-11 PROCEDURE — 36415 COLL VENOUS BLD VENIPUNCTURE: CPT | Mod: ZL | Performed by: FAMILY MEDICINE

## 2022-07-11 ASSESSMENT — PAIN SCALES - GENERAL: PAINLEVEL: NO PAIN (1)

## 2022-07-11 NOTE — NURSING NOTE
Patient here for ER follow up form 06/29/22 for hematuria and thrombocytopenia. He says he is feeling much better today he even woke up with out a headache this morning.  Medication Reconciliation: complete.    Lima Baltazar LPN  7/11/2022 11:33 AM

## 2022-07-11 NOTE — PROGRESS NOTES
"  Assessment & Plan       Thrombocytopenia (H)    - [facility] Referral  - CBC and Differential; Future  - CBC and Differential    Benign essential microscopic hematuria    - [facility] Referral  - UA reflex to Microscopic; Future  - UA reflex to Microscopic             Tobacco Cessation:   reports that he has been smoking cigarettes. He has a 12.50 pack-year smoking history. He has never used smokeless tobacco.      BMI:   Estimated body mass index is 30.96 kg/m  as calculated from the following:    Height as of 6/29/22: 1.803 m (5' 11\").    Weight as of this encounter: 100.7 kg (222 lb).       Follow-up if not improving    No follow-ups on file.    Elias Louis MD  [facility] AND [facility] [place]    Subjective   Alvaro is a 51 year old, presenting for the following health issues:  ER F/U (06/29/2022 hematuria thrombocytopenia )      Patient arrives here for follow-up hematuria and thrombocytopenia.  Recently diagnosed with Ehrlichia.  He is doing much better.    History of Present Illness       Reason for visit:  Follow up after er visit,issues with bleeding    He eats 0-1 servings of fruits and vegetables daily.He consumes 4 sweetened beverage(s) daily.He exercises with enough effort to increase his heart rate 9 or less minutes per day.  He exercises with enough effort to increase his heart rate 3 or less days per week.   He is taking medications regularly.     Results for orders placed or performed in visit on 07/11/22   UA reflex to Microscopic     Status: Normal   Result Value Ref Range    Color Urine Yellow Colorless, Straw, Light Yellow, Yellow    Appearance Urine Clear Clear    Glucose Urine Negative Negative mg/dL    Bilirubin Urine Negative Negative    Ketones Urine Negative Negative mg/dL    Specific Gravity Urine 1.007 1.000 - 1.030    Blood Urine Negative Negative    pH Urine 6.0 5.0 - 9.0    Protein Albumin Urine Negative Negative mg/dL    Urobilinogen Urine Normal Normal, 2.0 mg/dL    " Nitrite Urine Negative Negative    Leukocyte Esterase Urine Negative Negative    Narrative    Microscopic not indicated   CBC with platelets and differential     Status: Abnormal   Result Value Ref Range    WBC Count 12.4 (H) 4.0 - 11.0 10e3/uL    RBC Count 5.02 4.40 - 5.90 10e6/uL    Hemoglobin 15.4 13.3 - 17.7 g/dL    Hematocrit 45.7 40.0 - 53.0 %    MCV 91 78 - 100 fL    MCH 30.7 26.5 - 33.0 pg    MCHC 33.7 31.5 - 36.5 g/dL    RDW 14.0 10.0 - 15.0 %    Platelet Count 362 150 - 450 10e3/uL   Extra Tube     Status: None    Narrative    The following orders were created for panel order Extra Tube.  Procedure                               Abnormality         Status                     ---------                               -----------         ------                     Extra Serum Separator Tu...[[id number]]                      Final result                 Please view results for these tests on the individual orders.   Extra Serum Separator Tube (SST)     Status: None   Result Value Ref Range    Hold Specimen [facility]    Manual Differential     Status: Abnormal   Result Value Ref Range    % Neutrophils 46 %    % Lymphocytes 47 %    % Monocytes 7 %    % Eosinophils 0 %    % Basophils 0 %    Absolute Neutrophils 5.7 1.6 - 8.3 10e3/uL    Absolute Lymphocytes 5.8 (H) 0.8 - 5.3 10e3/uL    Absolute Monocytes 0.9 0.0 - 1.3 10e3/uL    Absolute Eosinophils 0.0 0.0 - 0.7 10e3/uL    Absolute Basophils 0.0 0.0 - 0.2 10e3/uL    RBC Morphology Confirmed RBC Indices     Platelet Assessment  Automated Count Confirmed. Platelet morphology is normal.     Automated Count Confirmed. Platelet morphology is normal.    Reactive Lymphocytes Present (A) None Seen   CBC and Differential     Status: Abnormal    Narrative    The following orders were created for panel order CBC and Differential.  Procedure                               Abnormality         Status                     ---------                               -----------         ------                      CBC with platelets and d...[[id number]]  Abnormal            Final result               Manual Differential[[id number]]          Abnormal            Final result                 Please view results for these tests on the individual orders.             Review of Systems         Objective    /76   Pulse 79   Temp 97.7  F (36.5  C)   Resp 20   Wt 100.7 kg (222 lb)   SpO2 97%   BMI 30.96 kg/m    Body mass index is 30.96 kg/m .  Physical Exam  Constitutional:       Appearance: Normal appearance.   HENT:      Head: Normocephalic.   Neurological:      Mental Status: He is alert.   Psychiatric:         Mood and Affect: Mood normal.         Thought Content: Thought content normal.                            .  ..

## 2022-08-12 ENCOUNTER — HOSPITAL ENCOUNTER (EMERGENCY)
Facility: OTHER | Age: 52
Discharge: HOME | End: 2022-08-12
Attending: EMERGENCY MEDICINE | Admitting: EMERGENCY MEDICINE
Payer: COMMERCIAL

## 2022-08-12 VITALS
HEART RATE: 85 BPM | TEMPERATURE: 97.8 F | RESPIRATION RATE: 18 BRPM | SYSTOLIC BLOOD PRESSURE: 129 MMHG | DIASTOLIC BLOOD PRESSURE: 86 MMHG | OXYGEN SATURATION: 99 %

## 2022-08-12 DIAGNOSIS — K02.9 DENTAL CARIES: ICD-10-CM

## 2022-08-12 PROCEDURE — 99282 EMERGENCY DEPT VISIT SF MDM: CPT | Performed by: EMERGENCY MEDICINE

## 2022-08-12 PROCEDURE — 99283 EMERGENCY DEPT VISIT LOW MDM: CPT | Performed by: EMERGENCY MEDICINE

## 2022-08-12 RX ORDER — IBUPROFEN 600 MG/1
600 TABLET, FILM COATED ORAL EVERY 6 HOURS PRN
Qty: 30 TABLET | Refills: 0 | Status: SHIPPED | OUTPATIENT
Start: 2022-08-12

## 2022-08-12 RX ORDER — AMOXICILLIN 500 MG/1
500 CAPSULE ORAL 2 TIMES DAILY
Qty: 30 CAPSULE | Refills: 0 | Status: SHIPPED | OUTPATIENT
Start: 2022-08-12 | End: 2023-11-16

## 2022-08-12 ASSESSMENT — ACTIVITIES OF DAILY LIVING (ADL): ADLS_ACUITY_SCORE: 33

## 2022-08-12 NOTE — ED PROVIDER NOTES
[facility]  Emergency Department Visit Note    Dental Pain      History of Present Illness     HPI:  Alvaro Gaitan is a 51 year old male presenting with dental pain. The pain is located in the right upper molars. These symptoms started 2 days ago. These teeth have been painful in the past. Theses teeth are carious. The pain is aggravated by chewing or touching the affected tooth. The patient has not seen a dentist regarding this pain. The patient has no fever, chills, oral discharge, sore throat, difficulty swallowing, difficulty breathing, shortness of breath.    Medications:  Prior to Admission medications    Medication Sig Last Dose Taking? Auth Provider Long Term End Date   amoxicillin (AMOXIL) 500 MG capsule Take 1 capsule (500 mg) by mouth 2 times daily  Yes Renée Chin MD     ibuprofen (ADVIL/MOTRIN) 600 MG tablet Take 1 tablet (600 mg) by mouth every 6 hours as needed for moderate pain  Yes Renée Chin MD     ASPIRIN LOW DOSE 81 MG EC tablet Take 81 mg by mouth daily   Reported, Patient     atorvastatin (LIPITOR) 40 MG tablet Take 40 mg by mouth daily   Reported, Patient Yes    clopidogrel (PLAVIX) 75 MG tablet Take 75 mg by mouth daily   Reported, Patient Yes    doxycycline monohydrate (MONODOX) 100 MG capsule Take 1 capsule (100 mg) by mouth 2 times daily   Andrew Kumar PA-C     isosorbide mononitrate (IMDUR) 60 MG 24 hr tablet Take 60 mg by mouth daily   Reported, Patient Yes    lisinopril (ZESTRIL) 2.5 MG tablet Take 2.5 mg by mouth daily   Reported, Patient Yes    nitroGLYcerin (NITROSTAT) 0.4 MG sublingual tablet    Reported, Patient Yes        Allergies:  Allergies   Allergen Reactions     Shellfish-Derived Products        Problem List:  There is no problem list on file for this patient.      Past Medical History:  Past Medical History:   Diagnosis Date     Atherosclerotic heart disease of native coronary artery without angina pectoris      2008,s/p stent     Glaucoma     No Comments Provided     Hyperlipidemia     2008     Male erectile dysfunction     No Comments Provided     Personal history of nicotine dependence     2008       Past Surgical History:  Past Surgical History:   Procedure Laterality Date     OTHER SURGICAL HISTORY      [id number],OTHER     OTHER SURGICAL HISTORY      [id number],OTHER,  screws       Social History:  Social History     Tobacco Use     Smoking status: Current Every Day Smoker     Packs/day: 0.50     Years: 25.00     Pack years: 12.50     Types: Cigarettes     Last attempt to quit: 2008     Years since quittin.5     Smokeless tobacco: Never Used     Tobacco comment: Quit smoking: Patient states that he smokes daily, but only one cigarettes   Vaping Use     Vaping Use: Never used   Substance Use Topics     Alcohol use: Yes     Comment: 2 a week     Drug use: Not Currently     Types: Other     Comment: Drug use: No       Review of Systems:  See HPI    Physical Exam     Vital signs: /86   Pulse 85   Temp 97.8  F (36.6  C) (Temporal)   Resp 18   SpO2 99%     Physical Exam:  General: awake and alert, uncomfortable  HEENT: no scleral injection, no nasal discharge, neck supple  Mouth: tooth # 1-3 is tender to percussion, no palpable fluctuance, poor dentition throughout, other teeth are nontender, oropharynx clear without erythema, swelling or masses  Extremities: no deformities, edema, or tenderness  Skin: warm, dry, no rashes  Neuro: alert, moving extremities x 4, ambulates without difficulty      Medical Decision Making & ED Course     Alvaro Gaitan is a 51 year old male presenting with dental pain. Differential includes dental caries, apical abscess, dental abscess, tooth fracture, peritonsillar abscess, gingivitis, pericoronitis, maxillary sinusitis. History and exam is most suggestive of dental caries or apical abscess. There is no fluctuance suggestive of a dental buccal or lingual alveolar  abscess. Exam is inconsistent with acute tooth fracture, gingival pathology, or posterior oropharynx soft tissue abscess.  Given the possibility of apical abscess, a trial of amoxicillin is warranted and the patient is stable for discharge with ibuprofen for pain control and close follow up with a dentist.  Patient given instructions on follow-up and warning signs for which to return to ED. All questions were answered and the patient is comfortable with plan for discharge. The patient was discharged in stable condition.        Diagnosis and Disposition     Diagnosis:  1. Dental caries      Disposition:  home    MD Elsy Thompson Theresa M, MD  08/12/22 0705

## 2022-08-12 NOTE — ED TRIAGE NOTES
Arrived from home via private vehicle.  CC of dental pain, primarily in the upper right molars.  There is significant tooth decay generally in his mouth.  No externally visible swelling.      Triage Assessment     Row Name 08/12/22 0642       Triage Assessment (Adult)    Airway WDL WDL       Respiratory WDL    Respiratory WDL WDL       Skin Circulation/Temperature WDL    Skin Circulation/Temperature WDL WDL       Cardiac WDL    Cardiac WDL WDL       Peripheral/Neurovascular WDL    Peripheral Neurovascular WDL WDL       Cognitive/Neuro/Behavioral WDL    Cognitive/Neuro/Behavioral WDL WDL

## 2022-09-17 ENCOUNTER — HEALTH MAINTENANCE LETTER (OUTPATIENT)
Age: 52
End: 2022-09-17

## 2023-01-28 ENCOUNTER — HEALTH MAINTENANCE LETTER (OUTPATIENT)
Age: 53
End: 2023-01-28

## 2023-09-18 ENCOUNTER — TELEPHONE (OUTPATIENT)
Dept: FAMILY MEDICINE | Facility: OTHER | Age: 53
End: 2023-09-18
Payer: COMMERCIAL

## 2023-09-18 DIAGNOSIS — I10 ESSENTIAL HYPERTENSION: ICD-10-CM

## 2023-09-18 DIAGNOSIS — I25.118 CORONARY ARTERY DISEASE OF NATIVE HEART WITH STABLE ANGINA PECTORIS, UNSPECIFIED VESSEL OR LESION TYPE (H): Primary | ICD-10-CM

## 2023-09-18 NOTE — TELEPHONE ENCOUNTER
Received a call from patient's wife Nelsy at 12:20PM.  Notified her that I need verbal permission from Alvaro to speak with her.  She asked me to call back after a minute so she could notify her  to answer his phone.  Alvaro's phone number provided by Nelsy of [phone].  Left message to call back at 12:25PM.  Lorri Richard RN......September 18, 2023...12:28 PM

## 2023-09-18 NOTE — TELEPHONE ENCOUNTER
Patient wife states that she is looking to see if his medications are going to be filled by Cardiology    Please call and advise    Thank You    Samantha Arteaga on 9/18/2023 at 10:29 AM

## 2023-09-18 NOTE — TELEPHONE ENCOUNTER
Patient called back and gave me permission to speak with his wife Nelsy regarding his healthcare information.  Spoke to wife who verified patient's full name and date of birth.  She states patient is needed refills of his medications to get him to his consult appointment with SIVAN Langley CNP on 11/16/23.  The med list is patient reported.  Patient used to see Dr. Fajardo in the [facility].  Nelsy states he no longer works there and the facility won't refill the meds anymore as he hasn't been seen in too long.  Patient is having chest pain again and the wife states that the meds help with the chest pain.  To SIVAN Langley CNP to address.  Lorri Richard RN......September 18, 2023...12:42 PM

## 2023-09-22 RX ORDER — ATORVASTATIN CALCIUM 40 MG/1
40 TABLET, FILM COATED ORAL AT BEDTIME
Qty: 90 TABLET | Refills: 0 | Status: SHIPPED | OUTPATIENT
Start: 2023-09-22 | End: 2023-11-08

## 2023-09-22 RX ORDER — CLOPIDOGREL BISULFATE 75 MG/1
75 TABLET ORAL DAILY
Qty: 90 TABLET | Refills: 0 | Status: SHIPPED | OUTPATIENT
Start: 2023-09-22 | End: 2023-11-16

## 2023-09-22 RX ORDER — LISINOPRIL 2.5 MG/1
2.5 TABLET ORAL DAILY
Qty: 90 TABLET | Refills: 0 | Status: SHIPPED | OUTPATIENT
Start: 2023-09-22 | End: 2023-11-16

## 2023-09-22 RX ORDER — ISOSORBIDE MONONITRATE 60 MG/1
60 TABLET, EXTENDED RELEASE ORAL DAILY
Qty: 90 TABLET | Refills: 0 | Status: SHIPPED | OUTPATIENT
Start: 2023-09-22 | End: 2023-11-08

## 2023-09-22 RX ORDER — NITROGLYCERIN 0.4 MG/1
0.4 TABLET SUBLINGUAL EVERY 5 MIN PRN
Qty: 6 TABLET | Refills: 11 | Status: SHIPPED | OUTPATIENT
Start: 2023-09-22

## 2023-09-22 RX ORDER — ASPIRIN 81 MG/1
81 TABLET, COATED ORAL DAILY
Qty: 90 TABLET | Refills: 0 | Status: SHIPPED | OUTPATIENT
Start: 2023-09-22 | End: 2024-01-09

## 2023-09-22 NOTE — TELEPHONE ENCOUNTER
Called and spoke to Patient after verifying last name and date of birth.  Patient notified and all immediate questions were answered.   Lilo Collins RN...........9/22/2023 2:32 PM

## 2023-09-22 NOTE — TELEPHONE ENCOUNTER
Please call and notify patient that all requested prescriptions were sent to his pharmacy in order to prevent him running out of medication before his appointment.  Thanks,   SIVAN Delgado CNP

## 2023-11-16 ENCOUNTER — OFFICE VISIT (OUTPATIENT)
Dept: CARDIOLOGY | Facility: OTHER | Age: 53
End: 2023-11-16
Attending: NURSE PRACTITIONER
Payer: COMMERCIAL

## 2023-11-16 ENCOUNTER — TELEPHONE (OUTPATIENT)
Dept: CARDIOLOGY | Facility: OTHER | Age: 53
End: 2023-11-16

## 2023-11-16 VITALS
BODY MASS INDEX: 27.66 KG/M2 | TEMPERATURE: 97.3 F | RESPIRATION RATE: 14 BRPM | HEART RATE: 74 BPM | OXYGEN SATURATION: 95 % | WEIGHT: 204.2 LBS | SYSTOLIC BLOOD PRESSURE: 104 MMHG | HEIGHT: 72 IN | DIASTOLIC BLOOD PRESSURE: 72 MMHG

## 2023-11-16 DIAGNOSIS — Z95.5 HISTORY OF CORONARY ARTERY STENT PLACEMENT: Primary | ICD-10-CM

## 2023-11-16 DIAGNOSIS — F17.200 TOBACCO DEPENDENCE SYNDROME: ICD-10-CM

## 2023-11-16 DIAGNOSIS — I10 ESSENTIAL HYPERTENSION: ICD-10-CM

## 2023-11-16 DIAGNOSIS — R07.9 EXERTIONAL CHEST PAIN: ICD-10-CM

## 2023-11-16 DIAGNOSIS — I25.118 CORONARY ARTERY DISEASE OF NATIVE HEART WITH STABLE ANGINA PECTORIS, UNSPECIFIED VESSEL OR LESION TYPE (H): ICD-10-CM

## 2023-11-16 DIAGNOSIS — R06.09 DOE (DYSPNEA ON EXERTION): ICD-10-CM

## 2023-11-16 DIAGNOSIS — E78.5 HYPERLIPIDEMIA LDL GOAL <70: ICD-10-CM

## 2023-11-16 LAB
ATRIAL RATE - MUSE: 69 BPM
DIASTOLIC BLOOD PRESSURE - MUSE: NORMAL MMHG
INTERPRETATION ECG - MUSE: NORMAL
P AXIS - MUSE: 58 DEGREES
PR INTERVAL - MUSE: 120 MS
QRS DURATION - MUSE: 102 MS
QT - MUSE: 380 MS
QTC - MUSE: 407 MS
R AXIS - MUSE: 40 DEGREES
SYSTOLIC BLOOD PRESSURE - MUSE: NORMAL MMHG
T AXIS - MUSE: 45 DEGREES
VENTRICULAR RATE- MUSE: 69 BPM

## 2023-11-16 PROCEDURE — 99204 OFFICE O/P NEW MOD 45 MIN: CPT | Performed by: NURSE PRACTITIONER

## 2023-11-16 PROCEDURE — 93000 ELECTROCARDIOGRAM COMPLETE: CPT | Performed by: INTERNAL MEDICINE

## 2023-11-16 RX ORDER — CLOPIDOGREL BISULFATE 75 MG/1
75 TABLET ORAL DAILY
Qty: 90 TABLET | Refills: 4 | Status: CANCELLED | OUTPATIENT
Start: 2023-11-16

## 2023-11-16 RX ORDER — LISINOPRIL 2.5 MG/1
2.5 TABLET ORAL DAILY
Qty: 90 TABLET | Refills: 4 | Status: CANCELLED | OUTPATIENT
Start: 2023-11-16

## 2023-11-16 RX ORDER — ATORVASTATIN CALCIUM 40 MG/1
40 TABLET, FILM COATED ORAL AT BEDTIME
Qty: 90 TABLET | Refills: 4 | Status: SHIPPED | OUTPATIENT
Start: 2023-11-16 | End: 2023-11-28

## 2023-11-16 RX ORDER — ISOSORBIDE MONONITRATE 60 MG/1
60 TABLET, EXTENDED RELEASE ORAL DAILY
Qty: 90 TABLET | Refills: 4 | Status: SHIPPED | OUTPATIENT
Start: 2023-11-16 | End: 2024-01-09

## 2023-11-16 ASSESSMENT — PAIN SCALES - GENERAL: PAINLEVEL: NO PAIN (0)

## 2023-11-16 NOTE — NURSING NOTE
Per SIVAN Langley CNP, pt to have COR Angiogram at [facility]. Reviewed medications.     -Pt will continue on Aspirin 81 mg daily, including day of procedure.     -Pt will HOLD your ibuprofen starting the day prior to procedure.    Angiogram teaching done using patient instructions.  Instructions reviewed, questions answered.  Pt verbalizes understanding. Now scheduled for 12/19/23 with arrival at 6:30AM.  Pt is agreeable to plan.

## 2023-11-16 NOTE — NURSING NOTE
"Chief Complaint   Patient presents with    New Patient     From [facility]        Initial /72 (BP Location: Right arm, Patient Position: Sitting, Cuff Size: Adult Large)   Pulse 74   Temp 97.3  F (36.3  C) (Temporal)   Resp 14   Ht 1.816 m (5' 11.5\")   Wt 92.6 kg (204 lb 3.2 oz)   SpO2 95%   BMI 28.08 kg/m   Estimated body mass index is 28.08 kg/m  as calculated from the following:    Height as of this encounter: 1.816 m (5' 11.5\").    Weight as of this encounter: 92.6 kg (204 lb 3.2 oz).  Meds Reconciled: complete  Pt is on Aspirin  Pt is on a Statin  Pt is not on Xarelto or Eliquis  Pt is not on a Warfarin   PHQ and/or ANDREW reviewed. Pt referred to PCP/MH Provider as appropriate.    Shayy Zuñiga LPN         "

## 2023-11-16 NOTE — PROGRESS NOTES
[facility] HEART CARE   CARDIOLOGY CONSULT     Alvaro Gaitan   [address] [place] [address]    No Ref-Primary, Physician     Chief Complaint   Patient presents with    New Patient     From [facility]:   Mr. Gaitan is a 53 year old male who presents for cardiology evaluation with known CAD. Patient was previously followed by cardiology at [facility], last visit with Dr. Kruger on 4/5/2021.    Patient with history three prior coronary PCI's.  10/2004- Taxus MANDA mLAD stent ([facility])  Cadiac cath 2/13/2008- mLAD stent patent, MANDA to OM  NSTEMI in December 2018, cardiac cath revealing 99% RCA stenosis treated with synergy MANDA 3.5 x 24 mm, 50 to 60% in-stent restenosis of the mid LAD and 60 to 70% in-stent restenosis of his LCx.  Additionally noted stenosis in the LAD proximal to the stent of about 80 to 90%.  Recommended staged PCI completed on 2/12/2019-intervention to the LAD was unsuccessful with inability to cross the lesion. Repeat cath on 2/20/2019 he underwent PCI with laser arthrectomy and Xience 3.0 x 18 mm MANDA to the LAD lesion along with Xience 2.75 X 18 mm MANDA to the OM.     Additional PMH includes HTN, HLD, tobacco abuse (currently smoking 1ppd).    Today, patient reports progressive fatigue and energy loss. Positive for exertional chest burning which is similar to anginal symptoms in the past. No radiation of pain to the arm, jaw, neck or intrascapular region. Admits that this chest discomfort does resolve with rest.  He has experienced increased exertional dyspnea. No edema. Positive for episodes of lightheadedness with chest pain, no syncope events. No palpitations.     PAST MEDICAL HISTORY:   Past Medical History:   Diagnosis Date    Atherosclerotic heart disease of native coronary artery without angina pectoris     2/4/2008,s/p stent    Glaucoma     No Comments Provided    Hyperlipidemia     2/4/2008    Male erectile dysfunction     No Comments Provided    Personal history of  nicotine dependence     2/5/2008          FAMILY HISTORY:   Family History   Problem Relation Age of Onset    Heart Disease Mother         Heart Disease    Heart Disease Father         Heart Disease    Hyperlipidemia Mother         Hyperlipidemia    Hyperlipidemia Father         Hyperlipidemia          PAST SURGICAL HISTORY:   Past Surgical History:   Procedure Laterality Date    OTHER SURGICAL HISTORY      [id number],OTHER    OTHER SURGICAL HISTORY      [id number],OTHER,  screws          SOCIAL HISTORY:   Social History     Socioeconomic History    Marital status:    Tobacco Use    Smoking status: Every Day     Packs/day: 0.50     Years: 25.00     Additional pack years: 0.00     Total pack years: 12.50     Types: Cigarettes     Last attempt to quit: 2/13/2008     Years since quitting: 15.7    Smokeless tobacco: Never    Tobacco comments:     Quit smoking: Patient states that he smokes daily, but only one cigarettes   Vaping Use    Vaping Use: Never used   Substance and Sexual Activity    Alcohol use: Yes     Comment: 2 a week    Drug use: Not Currently     Types: Other     Comment: Drug use: No   Social History Narrative     work in [employer]  2 children          CURRENT MEDICATIONS:   Current Outpatient Medications   Medication    ASPIRIN LOW DOSE 81 MG EC tablet    atorvastatin (LIPITOR) 40 MG tablet    ibuprofen (ADVIL/MOTRIN) 600 MG tablet    isosorbide mononitrate (IMDUR) 60 MG 24 hr tablet    nitroGLYcerin (NITROSTAT) 0.4 MG sublingual tablet     No current facility-administered medications for this visit.         ALLERGIES:   Allergies   Allergen Reactions    Shellfish-Derived Products Nausea and Vomiting          ROS:   CONSTITUTIONAL: No reported fever or chills. No changes in weight.  ENT: No visual disturbance, ear ache, epistaxis or sore throat.   CARDIOVASCULAR: Positive for exertional chest discomfort. No palpitations or lower extremity edema.   RESPIRATORY: Positive for increased  "dyspnea upon exertion. No cough, wheezing or hemoptysis.   GI: No reported abdominal pain.   : No reported hematuria or dysuria.   NEUROLOGICAL: Occasional exertional lightheadedness. No dizziness, syncope, ataxia, paresthesias or weakness.   HEMATOLOGIC: No history of anemia. No bleeding or excessive bruising. No history of blood clots.   MUSCULOSKELETAL: No joint pain or swelling, no muscle pain.  ENDOCRINOLOGIC: No temperature intolerance. No hair or skin changes.  SKIN: No abnormal rashes or sores, no unusual itching.  PSYCHIATRIC: No history of depression or anxiety. No changes in mood, feeling down or anxious. No      PHYSICAL EXAM:   /72 (BP Location: Right arm, Patient Position: Sitting, Cuff Size: Adult Large)   Pulse 74   Temp 97.3  F (36.3  C) (Temporal)   Resp 14   Ht 1.816 m (5' 11.5\")   Wt 92.6 kg (204 lb 3.2 oz)   SpO2 95%   BMI 28.08 kg/m    GENERAL: The patient is a well-developed, well-nourished, in no apparent distress.  HEENT: Head is normocephalic and atraumatic. Eyes are symmetrical with normal visual tracking. No icterus, no xanthelasmas. Nares appeared normal without nasal drainage. Mucous membranes are moist, no cyanosis.  NECK: Supple, no cervical bruits, JVP not visible.   CHEST/ LUNGS: Lungs clear to auscultation, no rales, rhonchi or wheezes, no use of accessory muscles, no retractions, respirations unlabored and normal respiratory rate.   CARDIO: Regular rate and rhythm normal with S1 and S2, no S3 or S4 and no murmur, click or rub.   ABD: Abdomen is nondistended.   EXTREMITIES: No edema present.   MUSCULOSKELETAL: No  visible joint swelling.   NEUROLOGIC: Alert and oriented X3. Normal speech, gait and affect. No focal neurologic deficits.   SKIN: No jaundice. No rashes or visible skin lesions present. No ecchymosis.     EKG:    NSR, rate 69 bpm, no ST-T changes    LAB RESULTS:   No visits with results within 3 Month(s) from this visit.   Latest known visit with results " is:   Office Visit on 07/11/2022   Component Date Value Ref Range Status    Color Urine 07/11/2022 Yellow  Colorless, Straw, Light Yellow, Yellow Final    Appearance Urine 07/11/2022 Clear  Clear Final    Glucose Urine 07/11/2022 Negative  Negative mg/dL Final    Bilirubin Urine 07/11/2022 Negative  Negative Final    Ketones Urine 07/11/2022 Negative  Negative mg/dL Final    Specific Gravity Urine 07/11/2022 1.007  1.000 - 1.030 Final    Blood Urine 07/11/2022 Negative  Negative Final    pH Urine 07/11/2022 6.0  5.0 - 9.0 Final    Protein Albumin Urine 07/11/2022 Negative  Negative mg/dL Final    Urobilinogen Urine 07/11/2022 Normal  Normal, 2.0 mg/dL Final    Nitrite Urine 07/11/2022 Negative  Negative Final    Leukocyte Esterase Urine 07/11/2022 Negative  Negative Final    WBC Count 07/11/2022 12.4 (H)  4.0 - 11.0 10e3/uL Final    RBC Count 07/11/2022 5.02  4.40 - 5.90 10e6/uL Final    Hemoglobin 07/11/2022 15.4  13.3 - 17.7 g/dL Final    Hematocrit 07/11/2022 45.7  40.0 - 53.0 % Final    MCV 07/11/2022 91  78 - 100 fL Final    MCH 07/11/2022 30.7  26.5 - 33.0 pg Final    MCHC 07/11/2022 33.7  31.5 - 36.5 g/dL Final    RDW 07/11/2022 14.0  10.0 - 15.0 % Final    Platelet Count 07/11/2022 362  150 - 450 10e3/uL Final    Hold Specimen 07/11/2022 JIC   Final    % Neutrophils 07/11/2022 46  % Final    % Lymphocytes 07/11/2022 47  % Final    % Monocytes 07/11/2022 7  % Final    % Eosinophils 07/11/2022 0  % Final    % Basophils 07/11/2022 0  % Final    Absolute Neutrophils 07/11/2022 5.7  1.6 - 8.3 10e3/uL Final    Absolute Lymphocytes 07/11/2022 5.8 (H)  0.8 - 5.3 10e3/uL Final    Absolute Monocytes 07/11/2022 0.9  0.0 - 1.3 10e3/uL Final    Absolute Eosinophils 07/11/2022 0.0  0.0 - 0.7 10e3/uL Final    Absolute Basophils 07/11/2022 0.0  0.0 - 0.2 10e3/uL Final    RBC Morphology 07/11/2022 Confirmed RBC Indices   Final    Platelet Assessment 07/11/2022 Automated Count Confirmed. Platelet morphology is normal.   Automated Count Confirmed. Platelet morphology is normal. Final    Reactive Lymphocytes 07/11/2022 Present (A)  None Seen Final          ASSESSMENT:   Alvaro Gaitan presents for cardiology evaluation with known CAD. Positive for recurrence of anginal symptoms. Patient was previously followed by cardiology at [facility], last visit with Dr. Kruger on 4/5/2021.  Today, patient reports progressive fatigue and energy loss. Positive for exertional chest burning which is similar to anginal symptoms in the past. No radiation of pain to the arm, jaw, neck or intrascapular region. Admits that this chest discomfort does resolve with rest.  He has experienced increased exertional dyspnea. No edema. Positive for episodes of lightheadedness with chest pain, no syncope events. No palpitations.     1. History of coronary artery stent placement-MANDA mLAD (2004), MANDA mLAD (2008), MANDA RCA (2018), MANDA LAD and OM (2019)  2. Coronary artery disease of native heart with stable angina pectoris, unspecified vessel or lesion type (H24)  3. Essential hypertension  4. Hyperlipidemia LDL goal <70  5. RUTLEDGE (dyspnea on exertion)  6. Exertional chest pain  7. Tobacco dependence syndrome    PLAN:   Patient with exertional angina, significant CAD history with multiple coronary stents placed and history of in-stent restenosis with continue tobacco abuse. Proceed with coronary angiography at [facility].  He will continue on ASA 81 mg daily, continue on high intensity statin with LDL goal <70 recommended.   Return for fasting labs.   He will continue on Imdur as previously prescribed. Nitroglycerin as needed.  Strongly encouraged tobacco cessation.   Follow-up with local cardiology 1-2 weeks s/p cardiac cath.    Orders Placed This Encounter   Procedures    Lipid Profile    Comprehensive metabolic panel    CBC with platelets    EKG 12-lead, tracing only (Same Day)    Case Request Cath Lab: Coronary Angiogram       Thank you for allowing me to participate  in the care of your patient. Please do not hesitate to contact me if you have any questions.     Total time 57 min on date of encounter spent reviewing records, face-to-face time performing HPI, physical exam, reviewing results of recent testing and counseling on the above diagnoses and recommended plan of care.    Gilda Flores, APRN CNP [facility]

## 2023-11-16 NOTE — PATIENT INSTRUCTIONS
Stop Lisinopril  Remain on Aspirin 81 mg daily life long.   Return for fasting labs on Monday, November 20th.   Referral for coronary angiography at [facility].   Imdur and Atorvastatin refilled today.  Follow-up with local cardiology 1-2 weeks after coronary angiography procedure.        Pre-procedure instructions - Coronary Angiogram  Patient Education    Your arrival time is 12/19/23 with check in at 6:30AM.  Location is [address] Waiting Room  Please plan on being at the hospital all day.  At any time, emergencies and/or urgent cases may come up which could delay the start of your procedure.    Pre-procedure instructions - Coronary Angiogram  Shower in the evening before or the morning of the procedure  No solid food for 8 hours prior and nothing to drink 2 hours prior to arrival time  You can take your morning medications (except for diabetic and blood thinners) with sips of water.  Take 81 mg of Asprin once daily even on the morning of procedure.   HOLD your ibuprofen starting the day prior to procedure.  You will need to arrange a ride to drop you off and pick you up, as you will be unable to drive home.  Prior to discharge you may be required to lay flat for approximately 2-4 hours in the recovery unit to ensure proper clotting of the artery.              Diabetic Medication Instructions  Hold oral diabetic medication in morning of your procedure and for 48 hours after IV contrast is given  Typical instructions for insulin diabetic medication holding are below. However, please reach out to your Primary Care Provider or Endocrinologist for specific instructions  DO NOT take any oral diabetic medication, short-acting diabetes medications/insulin, humalog or regular insulin the morning of your test  Take   dose of long-acting insulin (Lantus, Levemir) the day of your test  Remember to bring your glucometer and insulin with you  to take after your test if needed  DO NOT take injectable GLP-1 agonists semaglutide (Ozempic, Wegovy), dulaglutide (Trulicity), exenatide ER (Bydureon), tirzepatide (Mounjaro), or oral semaglutide (Rybelsus) for 7 days prior your procedure  Hold once daily injectable GLP-1 agonists exenatide (Byetta), liraglutide (Saxenda, Victoza) the day before and day of your procedure                  Anticoagulation Medication Instructions   N/A    You will need to follow up with one of our cardiology APPs 1-2 weeks after your procedure. If you need help scheduling or rescheduling your appointment, please call [phone]

## 2023-11-16 NOTE — TELEPHONE ENCOUNTER
Patient's wife verified  and patient's name. Consent to communicate on file. Asked if Alvaro has ever been pre-treated prior to having IV contrast as patient has an allergy to shellfish. Nelsy verified that he does not have an allergy to IV contrast, just an allergy to shellfish which causes hives-itching-swelling-nausea and vomiting.     FYI has been forwarded to SIVAN Langley CNP for possible order of Coronary Angiogram.     Shayy Zuñiga LPN on 2023 at 4:28 PM

## 2023-11-17 RX ORDER — LIDOCAINE 40 MG/G
CREAM TOPICAL
Status: CANCELLED | OUTPATIENT
Start: 2023-11-17

## 2023-11-17 RX ORDER — POTASSIUM CHLORIDE 1500 MG/1
20 TABLET, EXTENDED RELEASE ORAL
Status: CANCELLED | OUTPATIENT
Start: 2023-11-17

## 2023-11-17 RX ORDER — POTASSIUM CHLORIDE 1500 MG/1
40 TABLET, EXTENDED RELEASE ORAL
Status: CANCELLED | OUTPATIENT
Start: 2023-11-17

## 2023-11-17 RX ORDER — SODIUM CHLORIDE 9 MG/ML
INJECTION, SOLUTION INTRAVENOUS CONTINUOUS
Status: CANCELLED | OUTPATIENT
Start: 2023-11-17

## 2023-11-23 ENCOUNTER — LAB (OUTPATIENT)
Dept: LAB | Facility: OTHER | Age: 53
End: 2023-11-23
Attending: NURSE PRACTITIONER
Payer: COMMERCIAL

## 2023-11-23 DIAGNOSIS — I25.118 CORONARY ARTERY DISEASE OF NATIVE HEART WITH STABLE ANGINA PECTORIS, UNSPECIFIED VESSEL OR LESION TYPE (H): ICD-10-CM

## 2023-11-23 DIAGNOSIS — R06.09 DOE (DYSPNEA ON EXERTION): ICD-10-CM

## 2023-11-23 DIAGNOSIS — E78.5 HYPERLIPIDEMIA LDL GOAL <70: ICD-10-CM

## 2023-11-23 DIAGNOSIS — Z95.5 HISTORY OF CORONARY ARTERY STENT PLACEMENT: ICD-10-CM

## 2023-11-23 LAB
ALBUMIN SERPL BCG-MCNC: 4.3 G/DL (ref 3.5–5.2)
ALP SERPL-CCNC: 102 U/L (ref 40–150)
ALT SERPL W P-5'-P-CCNC: 29 U/L (ref 0–70)
ANION GAP SERPL CALCULATED.3IONS-SCNC: 11 MMOL/L (ref 7–15)
AST SERPL W P-5'-P-CCNC: 22 U/L (ref 0–45)
BILIRUB SERPL-MCNC: 0.2 MG/DL
BUN SERPL-MCNC: 13.6 MG/DL (ref 6–20)
CALCIUM SERPL-MCNC: 9.6 MG/DL (ref 8.6–10)
CHLORIDE SERPL-SCNC: 102 MMOL/L (ref 98–107)
CHOLEST SERPL-MCNC: 178 MG/DL
CREAT SERPL-MCNC: 0.74 MG/DL (ref 0.67–1.17)
DEPRECATED HCO3 PLAS-SCNC: 24 MMOL/L (ref 22–29)
EGFRCR SERPLBLD CKD-EPI 2021: >90 ML/MIN/1.73M2
ERYTHROCYTE [DISTWIDTH] IN BLOOD BY AUTOMATED COUNT: 13.2 % (ref 10–15)
GLUCOSE SERPL-MCNC: 101 MG/DL (ref 70–99)
HCT VFR BLD AUTO: 47.8 % (ref 40–53)
HDLC SERPL-MCNC: 46 MG/DL
HGB BLD-MCNC: 16.3 G/DL (ref 13.3–17.7)
LDLC SERPL CALC-MCNC: 89 MG/DL
MCH RBC QN AUTO: 32 PG (ref 26.5–33)
MCHC RBC AUTO-ENTMCNC: 34.1 G/DL (ref 31.5–36.5)
MCV RBC AUTO: 94 FL (ref 78–100)
NONHDLC SERPL-MCNC: 132 MG/DL
PLATELET # BLD AUTO: 252 10E3/UL (ref 150–450)
POTASSIUM SERPL-SCNC: 4.4 MMOL/L (ref 3.4–5.3)
PROT SERPL-MCNC: 7 G/DL (ref 6.4–8.3)
RBC # BLD AUTO: 5.1 10E6/UL (ref 4.4–5.9)
SODIUM SERPL-SCNC: 137 MMOL/L (ref 135–145)
TRIGL SERPL-MCNC: 214 MG/DL
WBC # BLD AUTO: 10.7 10E3/UL (ref 4–11)

## 2023-11-23 PROCEDURE — 80061 LIPID PANEL: CPT | Mod: ZL

## 2023-11-23 PROCEDURE — 85027 COMPLETE CBC AUTOMATED: CPT | Mod: ZL

## 2023-11-23 PROCEDURE — 36415 COLL VENOUS BLD VENIPUNCTURE: CPT | Mod: ZL

## 2023-11-23 PROCEDURE — 80053 COMPREHEN METABOLIC PANEL: CPT | Mod: ZL

## 2023-11-28 DIAGNOSIS — I25.118 CORONARY ARTERY DISEASE OF NATIVE HEART WITH STABLE ANGINA PECTORIS, UNSPECIFIED VESSEL OR LESION TYPE (H): ICD-10-CM

## 2023-11-28 RX ORDER — ATORVASTATIN CALCIUM 80 MG/1
80 TABLET, FILM COATED ORAL AT BEDTIME
Qty: 90 TABLET | Refills: 3 | Status: SHIPPED | OUTPATIENT
Start: 2023-11-28

## 2023-12-19 ENCOUNTER — HOSPITAL ENCOUNTER (OUTPATIENT)
Facility: CLINIC | Age: 53
Discharge: HOME | End: 2023-12-19
Attending: INTERNAL MEDICINE | Admitting: INTERNAL MEDICINE
Payer: COMMERCIAL

## 2023-12-19 ENCOUNTER — APPOINTMENT (OUTPATIENT)
Dept: LAB | Facility: CLINIC | Age: 53
End: 2023-12-19
Attending: INTERNAL MEDICINE
Payer: COMMERCIAL

## 2023-12-19 ENCOUNTER — APPOINTMENT (OUTPATIENT)
Dept: MEDSURG UNIT | Facility: CLINIC | Age: 53
End: 2023-12-19
Attending: INTERNAL MEDICINE
Payer: COMMERCIAL

## 2023-12-19 VITALS
DIASTOLIC BLOOD PRESSURE: 69 MMHG | OXYGEN SATURATION: 97 % | RESPIRATION RATE: 16 BRPM | SYSTOLIC BLOOD PRESSURE: 99 MMHG | TEMPERATURE: 98.2 F | HEART RATE: 68 BPM

## 2023-12-19 DIAGNOSIS — F17.200 TOBACCO DEPENDENCE SYNDROME: ICD-10-CM

## 2023-12-19 DIAGNOSIS — Z95.5 HISTORY OF CORONARY ARTERY STENT PLACEMENT: ICD-10-CM

## 2023-12-19 DIAGNOSIS — R06.09 DOE (DYSPNEA ON EXERTION): ICD-10-CM

## 2023-12-19 DIAGNOSIS — I25.118 CORONARY ARTERY DISEASE OF NATIVE HEART WITH STABLE ANGINA PECTORIS, UNSPECIFIED VESSEL OR LESION TYPE (H): Primary | ICD-10-CM

## 2023-12-19 DIAGNOSIS — R07.9 EXERTIONAL CHEST PAIN: ICD-10-CM

## 2023-12-19 PROBLEM — Z98.890 STATUS POST CORONARY ANGIOGRAM: Status: ACTIVE | Noted: 2023-12-19

## 2023-12-19 LAB
ACT BLD: 364 SECONDS (ref 74–150)
ANION GAP SERPL CALCULATED.3IONS-SCNC: 10 MMOL/L (ref 7–15)
BUN SERPL-MCNC: 10.7 MG/DL (ref 6–20)
CALCIUM SERPL-MCNC: 9.3 MG/DL (ref 8.6–10)
CHLORIDE SERPL-SCNC: 106 MMOL/L (ref 98–107)
CHOLEST SERPL-MCNC: 202 MG/DL
CREAT SERPL-MCNC: 0.73 MG/DL (ref 0.67–1.17)
DEPRECATED HCO3 PLAS-SCNC: 22 MMOL/L (ref 22–29)
EGFRCR SERPLBLD CKD-EPI 2021: >90 ML/MIN/1.73M2
ERYTHROCYTE [DISTWIDTH] IN BLOOD BY AUTOMATED COUNT: 12.9 % (ref 10–15)
GLUCOSE BLDC GLUCOMTR-MCNC: 92 MG/DL (ref 70–99)
GLUCOSE SERPL-MCNC: 85 MG/DL (ref 70–99)
HCT VFR BLD AUTO: 46.7 % (ref 40–53)
HDLC SERPL-MCNC: 33 MG/DL
HGB BLD-MCNC: 15.9 G/DL (ref 13.3–17.7)
INR PPP: 0.94 (ref 0.85–1.15)
LDLC SERPL CALC-MCNC: 115 MG/DL
MCH RBC QN AUTO: 32.1 PG (ref 26.5–33)
MCHC RBC AUTO-ENTMCNC: 34 G/DL (ref 31.5–36.5)
MCV RBC AUTO: 94 FL (ref 78–100)
NONHDLC SERPL-MCNC: 169 MG/DL
PLATELET # BLD AUTO: 251 10E3/UL (ref 150–450)
POTASSIUM SERPL-SCNC: 3.9 MMOL/L (ref 3.4–5.3)
RBC # BLD AUTO: 4.96 10E6/UL (ref 4.4–5.9)
SODIUM SERPL-SCNC: 138 MMOL/L (ref 135–145)
TRIGL SERPL-MCNC: 270 MG/DL
WBC # BLD AUTO: 10.7 10E3/UL (ref 4–11)

## 2023-12-19 PROCEDURE — 258N000003 HC RX IP 258 OP 636: Performed by: NURSE PRACTITIONER

## 2023-12-19 PROCEDURE — 92928 PRQ TCAT PLMT NTRAC ST 1 LES: CPT | Mod: RC | Performed by: INTERNAL MEDICINE

## 2023-12-19 PROCEDURE — 93010 ELECTROCARDIOGRAM REPORT: CPT | Mod: XU | Performed by: INTERNAL MEDICINE

## 2023-12-19 PROCEDURE — 85027 COMPLETE CBC AUTOMATED: CPT | Performed by: NURSE PRACTITIONER

## 2023-12-19 PROCEDURE — C9600 PERC DRUG-EL COR STENT SING: HCPCS | Performed by: INTERNAL MEDICINE

## 2023-12-19 PROCEDURE — 99153 MOD SED SAME PHYS/QHP EA: CPT | Performed by: INTERNAL MEDICINE

## 2023-12-19 PROCEDURE — 36415 COLL VENOUS BLD VENIPUNCTURE: CPT | Performed by: NURSE PRACTITIONER

## 2023-12-19 PROCEDURE — 999N000132 HC STATISTIC PP CARE STAGE 1

## 2023-12-19 PROCEDURE — 999N000134 HC STATISTIC PP CARE STAGE 3

## 2023-12-19 PROCEDURE — 250N000013 HC RX MED GY IP 250 OP 250 PS 637: Performed by: INTERNAL MEDICINE

## 2023-12-19 PROCEDURE — 80048 BASIC METABOLIC PNL TOTAL CA: CPT | Performed by: NURSE PRACTITIONER

## 2023-12-19 PROCEDURE — 93005 ELECTROCARDIOGRAM TRACING: CPT

## 2023-12-19 PROCEDURE — 99152 MOD SED SAME PHYS/QHP 5/>YRS: CPT | Mod: GC | Performed by: INTERNAL MEDICINE

## 2023-12-19 PROCEDURE — C1769 GUIDE WIRE: HCPCS | Performed by: INTERNAL MEDICINE

## 2023-12-19 PROCEDURE — C1894 INTRO/SHEATH, NON-LASER: HCPCS | Performed by: INTERNAL MEDICINE

## 2023-12-19 PROCEDURE — C1726 CATH, BAL DIL, NON-VASCULAR: HCPCS | Performed by: INTERNAL MEDICINE

## 2023-12-19 PROCEDURE — C1874 STENT, COATED/COV W/DEL SYS: HCPCS | Performed by: INTERNAL MEDICINE

## 2023-12-19 PROCEDURE — 93454 CORONARY ARTERY ANGIO S&I: CPT | Performed by: INTERNAL MEDICINE

## 2023-12-19 PROCEDURE — 82962 GLUCOSE BLOOD TEST: CPT

## 2023-12-19 PROCEDURE — 250N000011 HC RX IP 250 OP 636: Performed by: INTERNAL MEDICINE

## 2023-12-19 PROCEDURE — 85347 COAGULATION TIME ACTIVATED: CPT

## 2023-12-19 PROCEDURE — 250N000009 HC RX 250: Performed by: INTERNAL MEDICINE

## 2023-12-19 PROCEDURE — 85610 PROTHROMBIN TIME: CPT | Performed by: NURSE PRACTITIONER

## 2023-12-19 PROCEDURE — 99152 MOD SED SAME PHYS/QHP 5/>YRS: CPT | Performed by: INTERNAL MEDICINE

## 2023-12-19 PROCEDURE — 80061 LIPID PANEL: CPT | Performed by: NURSE PRACTITIONER

## 2023-12-19 PROCEDURE — C1887 CATHETER, GUIDING: HCPCS | Performed by: INTERNAL MEDICINE

## 2023-12-19 PROCEDURE — 93454 CORONARY ARTERY ANGIO S&I: CPT | Mod: 26 | Performed by: INTERNAL MEDICINE

## 2023-12-19 PROCEDURE — 272N000001 HC OR GENERAL SUPPLY STERILE: Performed by: INTERNAL MEDICINE

## 2023-12-19 PROCEDURE — C1725 CATH, TRANSLUMIN NON-LASER: HCPCS | Performed by: INTERNAL MEDICINE

## 2023-12-19 PROCEDURE — 250N000011 HC RX IP 250 OP 636: Mod: JZ | Performed by: INTERNAL MEDICINE

## 2023-12-19 PROCEDURE — 999N000054 HC STATISTIC EKG NON-CHARGEABLE

## 2023-12-19 DEVICE — STENT CORONARY DES SYNERGY XD MR US 3.50X16MM H7493941816350: Type: IMPLANTABLE DEVICE | Status: FUNCTIONAL

## 2023-12-19 RX ORDER — NALOXONE HYDROCHLORIDE 0.4 MG/ML
0.4 INJECTION, SOLUTION INTRAMUSCULAR; INTRAVENOUS; SUBCUTANEOUS
Status: DISCONTINUED | OUTPATIENT
Start: 2023-12-19 | End: 2023-12-19 | Stop reason: HOSPADM

## 2023-12-19 RX ORDER — HYDRALAZINE HYDROCHLORIDE 20 MG/ML
10 INJECTION INTRAMUSCULAR; INTRAVENOUS EVERY 4 HOURS PRN
Status: DISCONTINUED | OUTPATIENT
Start: 2023-12-19 | End: 2023-12-19 | Stop reason: HOSPADM

## 2023-12-19 RX ORDER — POTASSIUM CHLORIDE 750 MG/1
20 TABLET, EXTENDED RELEASE ORAL
Status: DISCONTINUED | OUTPATIENT
Start: 2023-12-19 | End: 2023-12-19 | Stop reason: HOSPADM

## 2023-12-19 RX ORDER — METOPROLOL TARTRATE 1 MG/ML
5 INJECTION, SOLUTION INTRAVENOUS
Status: DISCONTINUED | OUTPATIENT
Start: 2023-12-19 | End: 2023-12-19 | Stop reason: HOSPADM

## 2023-12-19 RX ORDER — FENTANYL CITRATE 50 UG/ML
INJECTION, SOLUTION INTRAMUSCULAR; INTRAVENOUS
Status: DISCONTINUED | OUTPATIENT
Start: 2023-12-19 | End: 2023-12-19 | Stop reason: HOSPADM

## 2023-12-19 RX ORDER — SODIUM CHLORIDE 9 MG/ML
INJECTION, SOLUTION INTRAVENOUS CONTINUOUS
Status: DISCONTINUED | OUTPATIENT
Start: 2023-12-19 | End: 2023-12-19

## 2023-12-19 RX ORDER — FENTANYL CITRATE 50 UG/ML
25 INJECTION, SOLUTION INTRAMUSCULAR; INTRAVENOUS
Status: DISCONTINUED | OUTPATIENT
Start: 2023-12-19 | End: 2023-12-19 | Stop reason: HOSPADM

## 2023-12-19 RX ORDER — FLUMAZENIL 0.1 MG/ML
0.2 INJECTION, SOLUTION INTRAVENOUS
Status: DISCONTINUED | OUTPATIENT
Start: 2023-12-19 | End: 2023-12-19 | Stop reason: HOSPADM

## 2023-12-19 RX ORDER — SODIUM CHLORIDE 9 MG/ML
INJECTION, SOLUTION INTRAVENOUS CONTINUOUS
Status: DISCONTINUED | OUTPATIENT
Start: 2023-12-19 | End: 2023-12-19 | Stop reason: HOSPADM

## 2023-12-19 RX ORDER — ASPIRIN 81 MG/1
81 TABLET, CHEWABLE ORAL DAILY
Status: DISCONTINUED | OUTPATIENT
Start: 2023-12-19 | End: 2023-12-19 | Stop reason: HOSPADM

## 2023-12-19 RX ORDER — ASPIRIN 81 MG/1
81 TABLET, CHEWABLE ORAL DAILY
Qty: 30 TABLET | Refills: 3 | Status: SHIPPED | OUTPATIENT
Start: 2023-12-19 | End: 2024-03-19

## 2023-12-19 RX ORDER — OXYCODONE HYDROCHLORIDE 5 MG/1
5 TABLET ORAL EVERY 4 HOURS PRN
Status: DISCONTINUED | OUTPATIENT
Start: 2023-12-19 | End: 2023-12-19 | Stop reason: HOSPADM

## 2023-12-19 RX ORDER — POTASSIUM CHLORIDE 750 MG/1
40 TABLET, EXTENDED RELEASE ORAL
Status: DISCONTINUED | OUTPATIENT
Start: 2023-12-19 | End: 2023-12-19 | Stop reason: HOSPADM

## 2023-12-19 RX ORDER — ASPIRIN 81 MG/1
81 TABLET ORAL DAILY
Status: DISCONTINUED | OUTPATIENT
Start: 2023-12-20 | End: 2023-12-19 | Stop reason: HOSPADM

## 2023-12-19 RX ORDER — NALOXONE HYDROCHLORIDE 0.4 MG/ML
0.2 INJECTION, SOLUTION INTRAMUSCULAR; INTRAVENOUS; SUBCUTANEOUS
Status: DISCONTINUED | OUTPATIENT
Start: 2023-12-19 | End: 2023-12-19 | Stop reason: HOSPADM

## 2023-12-19 RX ORDER — NICARDIPINE HYDROCHLORIDE 2.5 MG/ML
INJECTION INTRAVENOUS
Status: DISCONTINUED | OUTPATIENT
Start: 2023-12-19 | End: 2023-12-19 | Stop reason: HOSPADM

## 2023-12-19 RX ORDER — ATROPINE SULFATE 0.1 MG/ML
0.5 INJECTION INTRAVENOUS
Status: DISCONTINUED | OUTPATIENT
Start: 2023-12-19 | End: 2023-12-19 | Stop reason: HOSPADM

## 2023-12-19 RX ORDER — HEPARIN SODIUM 1000 [USP'U]/ML
INJECTION, SOLUTION INTRAVENOUS; SUBCUTANEOUS
Status: DISCONTINUED | OUTPATIENT
Start: 2023-12-19 | End: 2023-12-19 | Stop reason: HOSPADM

## 2023-12-19 RX ORDER — NITROGLYCERIN 5 MG/ML
VIAL (ML) INTRAVENOUS
Status: DISCONTINUED | OUTPATIENT
Start: 2023-12-19 | End: 2023-12-19 | Stop reason: HOSPADM

## 2023-12-19 RX ORDER — IOPAMIDOL 755 MG/ML
INJECTION, SOLUTION INTRAVASCULAR
Status: DISCONTINUED | OUTPATIENT
Start: 2023-12-19 | End: 2023-12-19 | Stop reason: HOSPADM

## 2023-12-19 RX ORDER — ONDANSETRON 4 MG/1
4 TABLET, ORALLY DISINTEGRATING ORAL EVERY 6 HOURS PRN
Status: DISCONTINUED | OUTPATIENT
Start: 2023-12-19 | End: 2023-12-19 | Stop reason: HOSPADM

## 2023-12-19 RX ORDER — ONDANSETRON 2 MG/ML
4 INJECTION INTRAMUSCULAR; INTRAVENOUS EVERY 6 HOURS PRN
Status: DISCONTINUED | OUTPATIENT
Start: 2023-12-19 | End: 2023-12-19 | Stop reason: HOSPADM

## 2023-12-19 RX ORDER — ASPIRIN 81 MG/1
81 TABLET, CHEWABLE ORAL ONCE
Status: DISCONTINUED | OUTPATIENT
Start: 2023-12-19 | End: 2023-12-19 | Stop reason: HOSPADM

## 2023-12-19 RX ORDER — OXYCODONE HYDROCHLORIDE 10 MG/1
10 TABLET ORAL EVERY 4 HOURS PRN
Status: DISCONTINUED | OUTPATIENT
Start: 2023-12-19 | End: 2023-12-19 | Stop reason: HOSPADM

## 2023-12-19 RX ORDER — LIDOCAINE 40 MG/G
CREAM TOPICAL
Status: DISCONTINUED | OUTPATIENT
Start: 2023-12-19 | End: 2023-12-19 | Stop reason: HOSPADM

## 2023-12-19 RX ORDER — NITROGLYCERIN 0.4 MG/1
0.4 TABLET SUBLINGUAL EVERY 5 MIN PRN
Status: DISCONTINUED | OUTPATIENT
Start: 2023-12-19 | End: 2023-12-19 | Stop reason: HOSPADM

## 2023-12-19 RX ADMIN — ASPIRIN 81 MG CHEWABLE TABLET 81 MG: 81 TABLET CHEWABLE at 08:26

## 2023-12-19 RX ADMIN — SODIUM CHLORIDE: 9 INJECTION, SOLUTION INTRAVENOUS at 07:56

## 2023-12-19 ASSESSMENT — ACTIVITIES OF DAILY LIVING (ADL)
ADLS_ACUITY_SCORE: 35

## 2023-12-19 NOTE — Clinical Note
dry, intact, no bleeding and no hematoma. RRA 6Fr- removed, TR band applied, 10cc air instilled, armboard.

## 2023-12-19 NOTE — Clinical Note
Stent deployed in the middle right coronary artery. Max pressure = 15 leyla. Total duration = 20 seconds.

## 2023-12-19 NOTE — Clinical Note
The first balloon was inserted into the right coronary artery and middle right coronary artery.Max pressure = 12 leyla. Total duration = 10 seconds.     Max pressure = 12 leyla. Total duration = 13 seconds.    Balloon reinflated a second time: Max pressure = 12 leyla. Total duration = 13 seconds.

## 2023-12-19 NOTE — PROGRESS NOTES
Patient prepped for coronary angiogram. VSS. Appropriately NPO. Labs WNL. PIV in left FA,fluids infusing. Groin and left wrist clipped. Takes asprin 81mg daily, did not take this am, 81mg given in prep per Dr. Quinn. Pedal pulses marked, 2+. Consent signed. Wife Nelsy with patient.

## 2023-12-19 NOTE — PROGRESS NOTES
Patient tolerated recovery stage well. VSS, right wrist site clean/dry/intact, no hematoma, and denies pain. Patient tolerated PO food and fluids. Teaching was done and discharge instructions were given. Patient ambulated, voided, and PIV was removed. Patient discharged from the hospital via wheel chair to home with family.

## 2023-12-19 NOTE — Clinical Note
Prepped: groin and right radial. The patient was draped. .Pre-procedure site marking:Insertion site not predetermined

## 2023-12-19 NOTE — Clinical Note
The first balloon was inserted into the right coronary artery and middle right coronary artery.Max pressure = 10 leyla. Total duration = 8 seconds.

## 2023-12-19 NOTE — DISCHARGE INSTRUCTIONS
If you have any questions or concerns regarding your procedure site please call [phone] at anytime and ask for Cardiology Fellow on call.  They are available 24 hours a day.  You may also contact the Cardiology Clinic after hours number at [phone].    [facility] [place]:  [phone] (7 days a week)  Going Home after an Angioplasty or Stent Placement (Cardiac)  ______________________________________________  After you go home:  Have an adult stay with you for 24 hours.  Drink plenty of fluids.  You may eat your normal diet.  Resume all home medications. Do not miss any doses of your ticagrelor.  For 24 hours:  Relax and take it easy.  Do NOT smoke.  Do NOT make any important or legal decisions.  Do NOT drive or operate machines at home or at work.  Do NOT drink alcohol.  You may shower starting tomorrow. Do NOT take a bath, or use a hot tub or pool for at least 3 days.  Remove the Band-Aid tomorrow. If there is minor oozing, apply another Band-aid and remove it after 12 hours.  Do not scrub the site. Do not use lotion or powder near the puncture site for 3 days.    Care of wrist or arm site  It is normal to have soreness at the puncture site and mild tingling in your hand for up to 3 days.  For 2 days, do not use your hand or arm to support your weight (such as rising from a chair) or bend your wrist (such as lifting a garage door).  For 2 days, do not lift more than 5 pounds or exercise your arm (tennis, golf or bowling).  For 2 days, do NOT have sex or do any heavy exercise.    If you start bleeding from the site in your arm:  Sit down and press firmly on the site with your fingers for 10 minutes. Call your doctor as soon as you can.  If the bleeding stops, sit still and keep your wrist straight for 2 hours.    Call 911 right away if you have bleeding that is heavy or does not stop.    Call your doctor if:  You have a large or growing hard lump around the site.  The  site is red, swollen, hot or tender.  Blood or fluid is draining from the site.  You have chills or a fever greater than 101 F (38 C).  Your leg or arm feels numb or cool.  You have hives, a rash or unusual itching.

## 2023-12-20 LAB
ATRIAL RATE - MUSE: 73 BPM
ATRIAL RATE - MUSE: 74 BPM
DIASTOLIC BLOOD PRESSURE - MUSE: NORMAL MMHG
DIASTOLIC BLOOD PRESSURE - MUSE: NORMAL MMHG
INTERPRETATION ECG - MUSE: NORMAL
INTERPRETATION ECG - MUSE: NORMAL
P AXIS - MUSE: 52 DEGREES
P AXIS - MUSE: 53 DEGREES
PR INTERVAL - MUSE: 122 MS
PR INTERVAL - MUSE: 134 MS
QRS DURATION - MUSE: 96 MS
QRS DURATION - MUSE: 98 MS
QT - MUSE: 376 MS
QT - MUSE: 394 MS
QTC - MUSE: 414 MS
QTC - MUSE: 437 MS
R AXIS - MUSE: 12 DEGREES
R AXIS - MUSE: 20 DEGREES
SYSTOLIC BLOOD PRESSURE - MUSE: NORMAL MMHG
SYSTOLIC BLOOD PRESSURE - MUSE: NORMAL MMHG
T AXIS - MUSE: 19 DEGREES
T AXIS - MUSE: 20 DEGREES
VENTRICULAR RATE- MUSE: 73 BPM
VENTRICULAR RATE- MUSE: 74 BPM

## 2023-12-26 ENCOUNTER — TELEPHONE (OUTPATIENT)
Dept: CARDIAC REHAB | Facility: OTHER | Age: 53
End: 2023-12-26
Payer: COMMERCIAL

## 2024-01-09 ENCOUNTER — OFFICE VISIT (OUTPATIENT)
Dept: CARDIOLOGY | Facility: OTHER | Age: 54
End: 2024-01-09
Attending: NURSE PRACTITIONER
Payer: COMMERCIAL

## 2024-01-09 VITALS
DIASTOLIC BLOOD PRESSURE: 60 MMHG | HEIGHT: 72 IN | HEART RATE: 80 BPM | OXYGEN SATURATION: 95 % | BODY MASS INDEX: 29.17 KG/M2 | RESPIRATION RATE: 14 BRPM | SYSTOLIC BLOOD PRESSURE: 94 MMHG | WEIGHT: 215.4 LBS | TEMPERATURE: 98 F

## 2024-01-09 DIAGNOSIS — F17.200 TOBACCO DEPENDENCE SYNDROME: ICD-10-CM

## 2024-01-09 DIAGNOSIS — Z98.890 S/P CORONARY ANGIOGRAM: Primary | ICD-10-CM

## 2024-01-09 DIAGNOSIS — R06.09 DOE (DYSPNEA ON EXERTION): ICD-10-CM

## 2024-01-09 DIAGNOSIS — E78.5 HYPERLIPIDEMIA LDL GOAL <70: ICD-10-CM

## 2024-01-09 DIAGNOSIS — Z95.5 HISTORY OF CORONARY ARTERY STENT PLACEMENT: ICD-10-CM

## 2024-01-09 DIAGNOSIS — K21.00 GASTROESOPHAGEAL REFLUX DISEASE WITH ESOPHAGITIS WITHOUT HEMORRHAGE: ICD-10-CM

## 2024-01-09 DIAGNOSIS — Z95.5 S/P RIGHT CORONARY ARTERY (RCA) STENT PLACEMENT: ICD-10-CM

## 2024-01-09 DIAGNOSIS — I95.2 HYPOTENSION DUE TO MEDICATION: ICD-10-CM

## 2024-01-09 PROCEDURE — 99214 OFFICE O/P EST MOD 30 MIN: CPT | Performed by: NURSE PRACTITIONER

## 2024-01-09 RX ORDER — EZETIMIBE 10 MG/1
10 TABLET ORAL AT BEDTIME
Qty: 90 TABLET | Refills: 3 | Status: SHIPPED | OUTPATIENT
Start: 2024-01-09

## 2024-01-09 RX ORDER — PANTOPRAZOLE SODIUM 40 MG/1
40 TABLET, DELAYED RELEASE ORAL DAILY
Qty: 90 TABLET | Refills: 3 | Status: SHIPPED | OUTPATIENT
Start: 2024-01-09 | End: 2024-03-19 | Stop reason: SINTOL

## 2024-01-09 RX ORDER — ISOSORBIDE MONONITRATE 30 MG/1
30 TABLET, EXTENDED RELEASE ORAL DAILY
Qty: 90 TABLET | Refills: 3 | Status: SHIPPED | OUTPATIENT
Start: 2024-01-09 | End: 2024-03-19

## 2024-01-09 RX ORDER — ASPIRIN 81 MG/1
81 TABLET ORAL DAILY
Qty: 90 TABLET | Refills: 3 | Status: SHIPPED | OUTPATIENT
Start: 2024-01-09

## 2024-01-09 ASSESSMENT — PAIN SCALES - GENERAL: PAINLEVEL: NO PAIN (0)

## 2024-01-09 NOTE — PATIENT INSTRUCTIONS
Reduce Imdur to 30 mg daily due to low blood pressure.   Return for fasting lipids in 3 months.   Start Zetia 10 mg daily, take this along with Atorvastatin 80 mg every night.   Continue on tobacco cessation.  Protonix for acid reflex.  Follow-up with cardiology clinic in 6 months, sooner if needed.

## 2024-01-09 NOTE — NURSING NOTE
"Chief Complaint   Patient presents with    FU After Angiogram     12/19/2023        Initial BP 94/60 (BP Location: Right arm, Patient Position: Sitting, Cuff Size: Adult Large)   Pulse 80   Temp 98  F (36.7  C) (Temporal)   Resp 14   Ht 1.816 m (5' 11.5\")   Wt 97.7 kg (215 lb 6.4 oz)   SpO2 95%   BMI 29.62 kg/m   Estimated body mass index is 29.62 kg/m  as calculated from the following:    Height as of this encounter: 1.816 m (5' 11.5\").    Weight as of this encounter: 97.7 kg (215 lb 6.4 oz).  Meds Reconciled: complete  Pt is on Aspirin  Pt is on a Statin  Pt is not on Xarelto or Eliquis  Pt is not on a Warfarin   PHQ and/or ANDREW reviewed. Pt referred to PCP/MH Provider as appropriate.    Shayy Zuñiga LPN         "

## 2024-01-09 NOTE — PROGRESS NOTES
[facility]   CARDIOLOGY PROGRESS NOTE    Alvaro Gaitan   [address] [place] [address]    No Ref-Primary, Physician     Chief Complaint   Patient presents with    FU After Angiogram     12/19/2023         HPI:   Mr. Gaitan is a 53 year old male who presents for cardiology follow-up to visit on 11/16/2023 with known CAD, s/p cardiac cath with MANDA to RCA on 12/19/2023. Patient was previously followed by cardiology at [facility], last visit with Dr. Kruger on 4/5/2021.    Patient with history three prior coronary PCI's.  10/2004- Taxus MANDA mLAD stent ([facility])  Cadiac cath 2/13/2008- mLAD stent patent, MANDA to OM  NSTEMI in December 2018, cardiac cath revealing 99% RCA stenosis treated with synergy MANDA 3.5 x 24 mm, 50 to 60% in-stent restenosis of the mid LAD and 60 to 70% in-stent restenosis of his LCx.  Additionally noted stenosis in the LAD proximal to the stent of about 80 to 90%.  Recommended staged PCI completed on 2/12/2019-intervention to the LAD was unsuccessful with inability to cross the lesion. Repeat cath on 2/20/2019 he underwent PCI with laser arthrectomy and Xience 3.0 x 18 mm MANDA to the LAD lesion along with Xience 2.75 X 18 mm MANDA to the OM.     Additional PMH includes HTN, HLD, tobacco abuse (currently smoking 1ppd).    At his last visit in November, patient reported progressive fatigue and energy loss. Positive for exertional chest burning which is similar to anginal symptoms in the past. Chest discomfort resolved with rest.  He had experienced increased exertional dyspnea. No edema. Positive for episodes of lightheadedness with chest pain, no syncope events. No palpitations.     S/p cardiac cath on 12/19/2023 at [facility]:  Conclusion    RV Branch lesion is 99% stenosed.    Prox RCA lesion is 99% stenosed.    Ost RCA to Prox RCA lesion is 25% stenosed.    Ost Cx to Prox Cx lesion is 50% stenosed.    1st Mrg-1 lesion is 50% stenosed.    1st Mrg-2 lesion is 45% stenosed.    Mid LAD-1  lesion is 45% stenosed.    Mid LAD-2 lesion is 50% stenosed.    1st Diag lesion is 60% stenosed.    2nd Diag lesion is 50% stenosed.    Successful PCI of the mid RCA (instent restenosis) MANDA  The RCA was the culprit (instent restenosis at the RV marginal).  The lesion was stented with a 3.5x16mm MANDA.  An RV marginal had 99% stenosis at the ostium (likely a prior stent pinch).  Could not wire it and it closed after stenting (without symptoms or ECG change).    The left coronary has multiple moderate lesions that could be stented in the future, if needed. It was clear that the RCA wa driving the symptoms.    Today, patient reports chest pain resolved. Dyspnea level improved. No palpitations. Positive for fatigue and generalized lightheadedness with hypotension. No syncope. No edema. Continues to use tobacco. Positive for GERD.     PAST MEDICAL HISTORY:   Past Medical History:   Diagnosis Date    Atherosclerotic heart disease of native coronary artery without angina pectoris     2/4/2008,s/p stent    Glaucoma     No Comments Provided    Hyperlipidemia     2/4/2008    Male erectile dysfunction     No Comments Provided    Personal history of nicotine dependence     2/5/2008          FAMILY HISTORY:   Family History   Problem Relation Age of Onset    Heart Disease Mother         Heart Disease    Heart Disease Father         Heart Disease    Hyperlipidemia Mother         Hyperlipidemia    Hyperlipidemia Father         Hyperlipidemia          PAST SURGICAL HISTORY:   Past Surgical History:   Procedure Laterality Date    CV CORONARY ANGIOGRAM N/A 12/19/2023    Procedure: Coronary Angiogram;  Surgeon: Hermelindo Quinn MD;  Location: [facility] CARDIAC CATH LAB    CV PCI N/A 12/19/2023    Procedure: Percutaneous Coronary Intervention;  Surgeon: Hermelindo Quinn MD;  Location: [facility] CARDIAC CATH LAB    CV PCI ANGIOPLASTY N/A 12/19/2023    Procedure: Percutaneous Transluminal Angioplasty;  Surgeon: Hermelindo Quinn  MD Taran;  Location:  HEART CARDIAC CATH LAB    OTHER SURGICAL HISTORY      [id number],OTHER    OTHER SURGICAL HISTORY      [id number],OTHER,  screws          SOCIAL HISTORY:   Social History     Socioeconomic History    Marital status:    Tobacco Use    Smoking status: Every Day     Packs/day: 0.50     Years: 25.00     Additional pack years: 0.00     Total pack years: 12.50     Types: Cigarettes     Last attempt to quit: 2/13/2008     Years since quitting: 15.7    Smokeless tobacco: Never    Tobacco comments:     Quit smoking: Patient states that he smokes daily, but only one cigarettes   Vaping Use    Vaping Use: Never used   Substance and Sexual Activity    Alcohol use: Yes     Comment: 2 a week    Drug use: Not Currently     Types: Other     Comment: Drug use: No   Social History Narrative     work in [employer]  2 children          CURRENT MEDICATIONS:   Current Outpatient Medications   Medication    ASPIRIN LOW DOSE 81 MG EC tablet    atorvastatin (LIPITOR) 80 MG tablet    ibuprofen (ADVIL/MOTRIN) 600 MG tablet    isosorbide mononitrate (IMDUR) 60 MG 24 hr tablet    nitroGLYcerin (NITROSTAT) 0.4 MG sublingual tablet    ticagrelor (BRILINTA) 90 MG tablet    aspirin (ASA) 81 MG chewable tablet     No current facility-administered medications for this visit.         ALLERGIES:   Allergies   Allergen Reactions    Shellfish-Derived Products Nausea and Vomiting          ROS:   CONSTITUTIONAL: No reported fever or chills. No changes in weight.  ENT: No visual disturbance, ear ache, epistaxis or sore throat.   CARDIOVASCULAR: No recurrence of chest pain or pressure. No palpitations or lower extremity edema.   RESPIRATORY: Chronic dyspnea improved following PCI. No cough, wheezing or hemoptysis.   GI: No reported abdominal pain. Positive for GERD.   : No reported hematuria or dysuria.   NEUROLOGICAL: Occasional lightheadedness. No dizziness, syncope, ataxia, paresthesias or weakness.  "  HEMATOLOGIC: No history of anemia. No bleeding or excessive bruising. No history of blood clots.   MUSCULOSKELETAL: No new joint pain or swelling, no muscle pain.  ENDOCRINOLOGIC: No temperature intolerance. No hair or skin changes.  SKIN: No abnormal rashes or sores, no unusual itching.  PSYCHIATRIC: No history of depression or anxiety. No changes in mood, feeling down or anxious. No      PHYSICAL EXAM:   BP 94/60 (BP Location: Right arm, Patient Position: Sitting, Cuff Size: Adult Large)   Pulse 80   Temp 98  F (36.7  C) (Temporal)   Resp 14   Ht 1.816 m (5' 11.5\")   Wt 97.7 kg (215 lb 6.4 oz)   SpO2 95%   BMI 29.62 kg/m    GENERAL: The patient is a well-developed, well-nourished, in no apparent distress.  HEENT: Head is normocephalic and atraumatic. Eyes are symmetrical with normal visual tracking. No icterus, no xanthelasmas. Nares appeared normal without nasal drainage. Mucous membranes are moist, no cyanosis.  NECK: Supple, JVP not visible.   CHEST/ LUNGS: Lungs clear to auscultation, no rales, rhonchi or wheezes, no use of accessory muscles, no retractions, respirations unlabored and normal respiratory rate.   CARDIO: Regular rate and rhythm normal with S1 and S2, no S3 or S4 and no murmur, click or rub.   ABD: Abdomen is nondistended.   EXTREMITIES: No edema present.   MUSCULOSKELETAL: No  visible joint swelling.   NEUROLOGIC: Alert and oriented X3. Normal speech, gait and affect. No focal neurologic deficits.   SKIN: No jaundice. No rashes or visible skin lesions present. No ecchymosis. Right radial access site healed well.       LAB RESULTS:   Admission on 12/19/2023, Discharged on 12/19/2023   Component Date Value Ref Range Status    Sodium 12/19/2023 138  135 - 145 mmol/L Final    Reference intervals for this test were updated on 09/26/2023 to more accurately reflect our healthy population. There may be differences in the flagging of prior results with similar values performed with this method. " Interpretation of those prior results can be made in the context of the updated reference intervals.     Potassium 12/19/2023 3.9  3.4 - 5.3 mmol/L Final    Chloride 12/19/2023 106  98 - 107 mmol/L Final    Carbon Dioxide (CO2) 12/19/2023 22  22 - 29 mmol/L Final    Anion Gap 12/19/2023 10  7 - 15 mmol/L Final    Urea Nitrogen 12/19/2023 10.7  6.0 - 20.0 mg/dL Final    Creatinine 12/19/2023 0.73  0.67 - 1.17 mg/dL Final    GFR Estimate 12/19/2023 >90  >60 mL/min/1.73m2 Final    Calcium 12/19/2023 9.3  8.6 - 10.0 mg/dL Final    Glucose 12/19/2023 85  70 - 99 mg/dL Final    WBC Count 12/19/2023 10.7  4.0 - 11.0 10e3/uL Final    RBC Count 12/19/2023 4.96  4.40 - 5.90 10e6/uL Final    Hemoglobin 12/19/2023 15.9  13.3 - 17.7 g/dL Final    Hematocrit 12/19/2023 46.7  40.0 - 53.0 % Final    MCV 12/19/2023 94  78 - 100 fL Final    MCH 12/19/2023 32.1  26.5 - 33.0 pg Final    MCHC 12/19/2023 34.0  31.5 - 36.5 g/dL Final    RDW 12/19/2023 12.9  10.0 - 15.0 % Final    Platelet Count 12/19/2023 251  150 - 450 10e3/uL Final    INR 12/19/2023 0.94  0.85 - 1.15 Final    Ventricular Rate 12/19/2023 73  BPM Final    Atrial Rate 12/19/2023 73  BPM Final    NY Interval 12/19/2023 122  ms Final    QRS Duration 12/19/2023 98  ms Final    QT 12/19/2023 376  ms Final    QTc 12/19/2023 414  ms Final    P Axis 12/19/2023 52  degrees Final    R AXIS 12/19/2023 20  degrees Final    T Axis 12/19/2023 20  degrees Final    Interpretation ECG 12/19/2023    Final                    Value:Sinus rhythm  Normal ECG  When compared with ECG of 16-NOV-2023 10:05,  No significant change was found  Confirmed by MD ANDREA, DAVID ([id number]) on 12/20/2023 8:53:35 PM      Activated Clotting Time (Celite) P* 12/19/2023 364 (H)  74 - 150 seconds Final    Cholesterol 12/19/2023 202 (H)  <200 mg/dL Final    Triglycerides 12/19/2023 270 (H)  <150 mg/dL Final    Direct Measure HDL 12/19/2023 33 (L)  >=40 mg/dL Final    LDL Cholesterol Calculated 12/19/2023 115 (H)   <=100 mg/dL Final    Non HDL Cholesterol 12/19/2023 169 (H)  <130 mg/dL Final    Ventricular Rate 12/19/2023 74  BPM Incomplete    Atrial Rate 12/19/2023 74  BPM Incomplete    MD Interval 12/19/2023 134  ms Incomplete    QRS Duration 12/19/2023 96  ms Incomplete    QT 12/19/2023 394  ms Incomplete    QTc 12/19/2023 437  ms Incomplete    P Axis 12/19/2023 53  degrees Incomplete    R AXIS 12/19/2023 12  degrees Incomplete    T Axis 12/19/2023 19  degrees Incomplete    Interpretation ECG 12/19/2023    Incomplete                    Value:Sinus rhythm  Normal ECG  When compared with ECG of 19-DEC-2023 07:41, (unconfirmed)  No significant change was found      GLUCOSE BY METER POCT 12/19/2023 92  70 - 99 mg/dL Final         ASSESSMENT:   Alvaro Gaitan presents for cardiology follow-up to visit on 11/16/2023 with known CAD, s/p cardiac cath with MANDA to RCA on 12/19/2023. Patient was previously followed by cardiology at [facility], last visit with Dr. Kruger on 4/5/2021.  Today, patient reports chest pain resolved. Dyspnea level improved. No palpitations. Positive for fatigue and generalized lightheadedness with hypotension. No syncope. No edema. Continues to use tobacco. Positive for GERD.     1. S/P coronary angiogram (12/19/2023)  2. S/P right coronary artery (RCA) stent placement (12/19/2023)  3. History of coronary artery stent placement-MANDA mLAD (2004), MANDA mLAD (2008), MANDA RCA (2018), MANDA LAD and OM (2019)  4. Hypotension due to medication  5. RUTLEDGE (dyspnea on exertion)  6. Tobacco dependence syndrome  7. Hyperlipidemia LDL goal <70  8. Gastroesophageal reflux disease with esophagitis without hemorrhage  - pantoprazole (PROTONIX) 40 MG EC tablet; Take 1 tablet (40 mg) by mouth daily  Dispense: 90 tablet; Refill: 3      PLAN:   Patient with exertional angina, significant CAD history with multiple coronary stents placed and history of in-stent restenosis with continue tobacco abuse.   2.   S/p coronary angiography  at [facility] on 12/19/2023 revealing RCA culprit (instent restenosis at the RV marginal), lesion was stented with a 3.5x16mm MANDA.  An RV marginal had 99% stenosis at the ostium (likely a prior stent pinch).  Could not wire it and it closed after stenting (without symptoms or ECG change).  The left coronary has multiple moderate lesions that could be stented in the future, if needed. It was clear that the RCA wa driving the symptoms.  3.  He will continue on ASA 81 mg daily, Brilinta for 6-12 months s/p PCI. If brilinta adjusted to Plavix PPI will need to be adjusted to [id number] for GERD.   4.  Continue on high intensity statin with LDL goal <70 recommended. Continue Lipitor 80 mg nightly and start Zetia 10 mg daily. Reviewed low saturated fat diet.   5. Return for fast lipids and CMP in 3 months.   6. Reduce Imdur to 30 mg daily due to hypotension. Nitroglycerin as needed.  7. Strongly encouraged tobacco cessation.   8. Patient declines phase II cardiac rehab.  9. Follow-up with cardiology in 6 months, sooner if needed.     Orders Placed This Encounter   Procedures    Lipid Profile    Comprehensive metabolic panel       Thank you for allowing me to participate in the care of your patient. Please do not hesitate to contact me if you have any questions.     Gilda Flores, SIVAN CNP CH[facility]

## 2024-02-24 ENCOUNTER — HEALTH MAINTENANCE LETTER (OUTPATIENT)
Age: 54
End: 2024-02-24

## 2024-03-19 ENCOUNTER — OFFICE VISIT (OUTPATIENT)
Dept: CARDIOLOGY | Facility: OTHER | Age: 54
End: 2024-03-19
Attending: NURSE PRACTITIONER
Payer: COMMERCIAL

## 2024-03-19 VITALS
DIASTOLIC BLOOD PRESSURE: 82 MMHG | TEMPERATURE: 97.2 F | OXYGEN SATURATION: 98 % | HEART RATE: 78 BPM | RESPIRATION RATE: 16 BRPM | HEIGHT: 72 IN | SYSTOLIC BLOOD PRESSURE: 120 MMHG | BODY MASS INDEX: 30.88 KG/M2 | WEIGHT: 228 LBS

## 2024-03-19 DIAGNOSIS — Z95.5 HISTORY OF CORONARY ARTERY STENT PLACEMENT: ICD-10-CM

## 2024-03-19 DIAGNOSIS — Z95.5 S/P RIGHT CORONARY ARTERY (RCA) STENT PLACEMENT: Primary | ICD-10-CM

## 2024-03-19 DIAGNOSIS — R42 ORTHOSTATIC LIGHTHEADEDNESS: ICD-10-CM

## 2024-03-19 DIAGNOSIS — E78.5 HYPERLIPIDEMIA LDL GOAL <70: ICD-10-CM

## 2024-03-19 DIAGNOSIS — Z98.890 S/P CORONARY ANGIOGRAM: ICD-10-CM

## 2024-03-19 DIAGNOSIS — K21.00 GASTROESOPHAGEAL REFLUX DISEASE WITH ESOPHAGITIS WITHOUT HEMORRHAGE: ICD-10-CM

## 2024-03-19 PROCEDURE — 99214 OFFICE O/P EST MOD 30 MIN: CPT | Performed by: NURSE PRACTITIONER

## 2024-03-19 RX ORDER — FAMOTIDINE 20 MG/1
20 TABLET, FILM COATED ORAL 2 TIMES DAILY
Qty: 180 TABLET | Refills: 3 | Status: SHIPPED | OUTPATIENT
Start: 2024-03-19

## 2024-03-19 ASSESSMENT — PAIN SCALES - GENERAL: PAINLEVEL: NO PAIN (0)

## 2024-03-19 NOTE — PROGRESS NOTES
[facility] HEART [facility]   CARDIOLOGY PROGRESS NOTE    Alvaro Gaitan   [address] [place] [address]    No Ref-Primary, Physician     Chief Complaint   Patient presents with    Hypotension    Dizziness        HPI:   Mr. Gaitan is a 53 year old male who presents for cardiology follow-up to visit on 1/9/2024 with known CAD, s/p cardiac cath with MANDA to RCA on 12/19/2023. Patient was previously followed by cardiology at [facility], last visit with Dr. Kruger on 4/5/2021.    Patient with history three prior coronary PCI's.  10/2004- Taxus MANDA mLAD stent ([facility])  Cadiac cath 2/13/2008- mLAD stent patent, MANDA to OM  NSTEMI in December 2018, cardiac cath revealing 99% RCA stenosis treated with synergy MANDA 3.5 x 24 mm, 50 to 60% in-stent restenosis of the mid LAD and 60 to 70% in-stent restenosis of his LCx.  Additionally noted stenosis in the LAD proximal to the stent of about 80 to 90%.  Recommended staged PCI completed on 2/12/2019-intervention to the LAD was unsuccessful with inability to cross the lesion. Repeat cath on 2/20/2019 he underwent PCI with laser arthrectomy and Xience 3.0 x 18 mm MANDA to the LAD lesion along with Xience 2.75 X 18 mm MANDA to the OM.     Additional PMH includes HTN, HLD, tobacco abuse (currently smoking 1ppd).    At his last visit in November, patient reported progressive fatigue and energy loss. Positive for exertional chest burning which is similar to anginal symptoms in the past. Chest discomfort resolved with rest.  He had experienced increased exertional dyspnea. No edema. Positive for episodes of lightheadedness with chest pain, no syncope events. No palpitations.     S/p cardiac cath on 12/19/2023 at [facility]:  Conclusion    RV Branch lesion is 99% stenosed.    Prox RCA lesion is 99% stenosed.    Ost RCA to Prox RCA lesion is 25% stenosed.    Ost Cx to Prox Cx lesion is 50% stenosed.    1st Mrg-1 lesion is 50% stenosed.    1st Mrg-2 lesion is 45% stenosed.    Mid LAD-1 lesion is  45% stenosed.    Mid LAD-2 lesion is 50% stenosed.    1st Diag lesion is 60% stenosed.    2nd Diag lesion is 50% stenosed.    Successful PCI of the mid RCA (instent restenosis) MANDA  The RCA was the culprit (instent restenosis at the RV marginal).  The lesion was stented with a 3.5x16mm MANDA.  An RV marginal had 99% stenosis at the ostium (likely a prior stent pinch).  Could not wire it and it closed after stenting (without symptoms or ECG change).    The left coronary has multiple moderate lesions that could be stented in the future, if needed. It was clear that the RCA wa driving the symptoms.    Today, patient reports chest pain resolved. Positive for GERD. Dyspnea level improved. No palpitations. Positive for orthostatic lightheadedness with hypotension. No syncope. No edema. Continues to use tobacco.    PAST MEDICAL HISTORY:   Past Medical History:   Diagnosis Date    Atherosclerotic heart disease of native coronary artery without angina pectoris     2/4/2008,s/p stent    Glaucoma     No Comments Provided    Hyperlipidemia     2/4/2008    Male erectile dysfunction     No Comments Provided    Personal history of nicotine dependence     2/5/2008          FAMILY HISTORY:   Family History   Problem Relation Age of Onset    Heart Disease Mother         Heart Disease    Heart Disease Father         Heart Disease    Hyperlipidemia Mother         Hyperlipidemia    Hyperlipidemia Father         Hyperlipidemia          PAST SURGICAL HISTORY:   Past Surgical History:   Procedure Laterality Date    CV CORONARY ANGIOGRAM N/A 12/19/2023    Procedure: Coronary Angiogram;  Surgeon: Hermelindo Quinn MD;  Location: [facility] CARDIAC CATH LAB    CV PCI N/A 12/19/2023    Procedure: Percutaneous Coronary Intervention;  Surgeon: Hermelindo Quinn MD;  Location: [facility] CARDIAC CATH LAB    CV PCI ANGIOPLASTY N/A 12/19/2023    Procedure: Percutaneous Transluminal Angioplasty;  Surgeon: Hermelindo Quinn MD;  Location:   HEART CARDIAC CATH LAB    OTHER SURGICAL HISTORY      [id number],OTHER    OTHER SURGICAL HISTORY      [id number],OTHER,  screws          SOCIAL HISTORY:   Social History     Socioeconomic History    Marital status:    Tobacco Use    Smoking status: Every Day     Packs/day: 0.50     Years: 25.00     Additional pack years: 0.00     Total pack years: 12.50     Types: Cigarettes     Last attempt to quit: 2/13/2008     Years since quitting: 15.7    Smokeless tobacco: Never    Tobacco comments:     Quit smoking: Patient states that he smokes daily, but only one cigarettes   Vaping Use    Vaping Use: Never used   Substance and Sexual Activity    Alcohol use: Yes     Comment: 2 a week    Drug use: Not Currently     Types: Other     Comment: Drug use: No   Social History Narrative     work in [employer]  2 children          CURRENT MEDICATIONS:   Current Outpatient Medications   Medication    aspirin 81 MG EC tablet    atorvastatin (LIPITOR) 80 MG tablet    ezetimibe (ZETIA) 10 MG tablet    ibuprofen (ADVIL/MOTRIN) 600 MG tablet    isosorbide mononitrate (IMDUR) 30 MG 24 hr tablet    nitroGLYcerin (NITROSTAT) 0.4 MG sublingual tablet    ticagrelor (BRILINTA) 90 MG tablet    aspirin (ASA) 81 MG chewable tablet    pantoprazole (PROTONIX) 40 MG EC tablet     No current facility-administered medications for this visit.         ALLERGIES:   Allergies   Allergen Reactions    Shellfish-Derived Products Nausea and Vomiting          ROS:   CONSTITUTIONAL: No reported fever or chills. No changes in weight.  ENT: No visual disturbance, ear ache, epistaxis or sore throat.   CARDIOVASCULAR: No recurrence of chest pain or pressure. No palpitations or lower extremity edema.   RESPIRATORY: Chronic dyspnea improved following PCI. No cough, wheezing or hemoptysis.   GI: No reported abdominal pain. Positive for GERD.   : No reported hematuria or dysuria.   NEUROLOGICAL: Occasional lightheadedness. No dizziness, syncope,  "ataxia, paresthesias or weakness.   HEMATOLOGIC: No history of anemia. No bleeding or excessive bruising. No history of blood clots.   MUSCULOSKELETAL: No new joint pain or swelling, no muscle pain.  ENDOCRINOLOGIC: No temperature intolerance. No hair or skin changes.  SKIN: No abnormal rashes or sores, no unusual itching.  PSYCHIATRIC: No history of depression or anxiety. No changes in mood, feeling down or anxious. No      PHYSICAL EXAM:   /82 (BP Location: Right arm, Patient Position: Sitting, Cuff Size: Adult Large)   Pulse 78   Temp 97.2  F (36.2  C) (Temporal)   Resp 16   Ht 1.816 m (5' 11.5\")   Wt 103.4 kg (228 lb)   SpO2 98%   BMI 31.36 kg/m    GENERAL: The patient is a well-developed, well-nourished, in no apparent distress.  HEENT: Head is normocephalic and atraumatic. Eyes are symmetrical with normal visual tracking. No icterus, no xanthelasmas. Nares appeared normal without nasal drainage. Mucous membranes are moist, no cyanosis.  NECK: Supple, JVP not visible.   CHEST/ LUNGS: Lungs clear to auscultation, no rales, rhonchi or wheezes, no use of accessory muscles, no retractions, respirations unlabored and normal respiratory rate.   CARDIO: Regular rate and rhythm normal with S1 and S2, no S3 or S4 and no murmur, click or rub.   ABD: Abdomen is nondistended.   EXTREMITIES: No edema present.   MUSCULOSKELETAL: No  visible joint swelling.   NEUROLOGIC: Alert and oriented X3. Normal speech, gait and affect. No focal neurologic deficits.   SKIN: No jaundice. No rashes or visible skin lesions present. No ecchymosis. Right radial access site healed well.       LAB RESULTS:   Admission on 12/19/2023, Discharged on 12/19/2023   Component Date Value Ref Range Status    Sodium 12/19/2023 138  135 - 145 mmol/L Final    Reference intervals for this test were updated on 09/26/2023 to more accurately reflect our healthy population. There may be differences in the flagging of prior results with similar " values performed with this method. Interpretation of those prior results can be made in the context of the updated reference intervals.     Potassium 12/19/2023 3.9  3.4 - 5.3 mmol/L Final    Chloride 12/19/2023 106  98 - 107 mmol/L Final    Carbon Dioxide (CO2) 12/19/2023 22  22 - 29 mmol/L Final    Anion Gap 12/19/2023 10  7 - 15 mmol/L Final    Urea Nitrogen 12/19/2023 10.7  6.0 - 20.0 mg/dL Final    Creatinine 12/19/2023 0.73  0.67 - 1.17 mg/dL Final    GFR Estimate 12/19/2023 >90  >60 mL/min/1.73m2 Final    Calcium 12/19/2023 9.3  8.6 - 10.0 mg/dL Final    Glucose 12/19/2023 85  70 - 99 mg/dL Final    WBC Count 12/19/2023 10.7  4.0 - 11.0 10e3/uL Final    RBC Count 12/19/2023 4.96  4.40 - 5.90 10e6/uL Final    Hemoglobin 12/19/2023 15.9  13.3 - 17.7 g/dL Final    Hematocrit 12/19/2023 46.7  40.0 - 53.0 % Final    MCV 12/19/2023 94  78 - 100 fL Final    MCH 12/19/2023 32.1  26.5 - 33.0 pg Final    MCHC 12/19/2023 34.0  31.5 - 36.5 g/dL Final    RDW 12/19/2023 12.9  10.0 - 15.0 % Final    Platelet Count 12/19/2023 251  150 - 450 10e3/uL Final    INR 12/19/2023 0.94  0.85 - 1.15 Final    Ventricular Rate 12/19/2023 73  BPM Final    Atrial Rate 12/19/2023 73  BPM Final    GA Interval 12/19/2023 122  ms Final    QRS Duration 12/19/2023 98  ms Final    QT 12/19/2023 376  ms Final    QTc 12/19/2023 414  ms Final    P Axis 12/19/2023 52  degrees Final    R AXIS 12/19/2023 20  degrees Final    T Axis 12/19/2023 20  degrees Final    Interpretation ECG 12/19/2023    Final                    Value:Sinus rhythm  Normal ECG  When compared with ECG of 16-NOV-2023 10:05,  No significant change was found  Confirmed by MD ANDREA, DAVID ([id number]) on 12/20/2023 8:53:35 PM      Activated Clotting Time (Celite) P* 12/19/2023 364 (H)  74 - 150 seconds Final    Cholesterol 12/19/2023 202 (H)  <200 mg/dL Final    Triglycerides 12/19/2023 270 (H)  <150 mg/dL Final    Direct Measure HDL 12/19/2023 33 (L)  >=40 mg/dL Final    LDL  Cholesterol Calculated 12/19/2023 115 (H)  <=100 mg/dL Final    Non HDL Cholesterol 12/19/2023 169 (H)  <130 mg/dL Final    Ventricular Rate 12/19/2023 74  BPM Incomplete    Atrial Rate 12/19/2023 74  BPM Incomplete    DE Interval 12/19/2023 134  ms Incomplete    QRS Duration 12/19/2023 96  ms Incomplete    QT 12/19/2023 394  ms Incomplete    QTc 12/19/2023 437  ms Incomplete    P Axis 12/19/2023 53  degrees Incomplete    R AXIS 12/19/2023 12  degrees Incomplete    T Axis 12/19/2023 19  degrees Incomplete    Interpretation ECG 12/19/2023    Incomplete                    Value:Sinus rhythm  Normal ECG  When compared with ECG of 19-DEC-2023 07:41, (unconfirmed)  No significant change was found      GLUCOSE BY METER POCT 12/19/2023 92  70 - 99 mg/dL Final         ASSESSMENT:   Alvaro Gaitan presents for cardiology follow-up to visit on 1/9/2024 with known CAD, s/p cardiac cath with MANDA to RCA on 12/19/2023. Patient was previously followed by cardiology at [facility], last visit with Dr. Kruger on 4/5/2021.  Today, patient reports chest pain resolved. Positive for GERD. Dyspnea level improved. No palpitations. Positive for orthostatic lightheadedness with hypotension. No syncope. No edema. Continues to use tobacco.    1. S/P right coronary artery (RCA) stent placement (12/19/2023)  2. S/P coronary angiogram  3. Gastroesophageal reflux disease with esophagitis without hemorrhage  4. Orthostatic lightheadedness  5. Hyperlipidemia LDL goal <70  6. History of coronary artery stent placement-MANDA mLAD (2004), MANDA mLAD (2008), MANDA RCA (2018), MANDA LAD and OM (2019)      PLAN:   1.   S/p coronary angiography at [facility] on 12/19/2023 revealing RCA culprit (instent restenosis at the RV marginal), lesion was stented with a 3.5x16mm MANDA.  An RV marginal had 99% stenosis at the ostium (likely a prior stent pinch).  Could not wire it and it closed after stenting (without symptoms or ECG change).  The left coronary has multiple  moderate lesions that could be stented in the future, if needed. It was clear that the RCA wa driving the symptoms.  2.  He will continue on ASA 81 mg daily, Brilinta for12 months s/p PCI (complete 12/2024).   3.  Continue on high intensity statin with LDL goal <70 recommended. Continue Lipitor 80 mg nightly and start Zetia 10 mg daily. Reviewed low saturated fat diet.   4. Stop Imdur due to orthostatic hypotension.   5. Pepcid for GERD.     Thank you for allowing me to participate in the care of your patient. Please do not hesitate to contact me if you have any questions.     Gilda Flores, APRN CNP [facility]

## 2024-03-19 NOTE — NURSING NOTE
"Chief Complaint   Patient presents with    Hypotension    Dizziness       Initial /82 (BP Location: Right arm, Patient Position: Sitting, Cuff Size: Adult Large)   Pulse 78   Temp 97.2  F (36.2  C) (Temporal)   Resp 16   Ht 1.816 m (5' 11.5\")   Wt 103.4 kg (228 lb)   SpO2 98%   BMI 31.36 kg/m   Estimated body mass index is 31.36 kg/m  as calculated from the following:    Height as of this encounter: 1.816 m (5' 11.5\").    Weight as of this encounter: 103.4 kg (228 lb).  Meds Reconciled: complete  Pt is on Aspirin  Pt is on a Statin  Pt is not on Xarelto or Eliquis  Pt is not on a Warfarin   PHQ and/or ANDREW reviewed. Pt referred to PCP/MH Provider as appropriate.    Shayy Zuñiga LPN         "

## 2024-03-19 NOTE — PATIENT INSTRUCTIONS
Start Pepcid 20 mg before meals twice daily for acid reflux symptoms.   Stop Imdur due to orthostatic lightheadedness and low blood pressures.   Remain on Aspirin 81 mg daily, Brilinta refilled.

## 2024-04-09 ENCOUNTER — LAB (OUTPATIENT)
Dept: LAB | Facility: OTHER | Age: 54
End: 2024-04-09
Attending: NURSE PRACTITIONER
Payer: COMMERCIAL

## 2024-04-09 DIAGNOSIS — E78.5 HYPERLIPIDEMIA LDL GOAL <70: ICD-10-CM

## 2024-04-09 DIAGNOSIS — Z98.890 S/P CORONARY ANGIOGRAM: ICD-10-CM

## 2024-04-09 LAB
ALBUMIN SERPL BCG-MCNC: 4.5 G/DL (ref 3.5–5.2)
ALP SERPL-CCNC: 95 U/L (ref 40–150)
ALT SERPL W P-5'-P-CCNC: 34 U/L (ref 0–70)
ANION GAP SERPL CALCULATED.3IONS-SCNC: 10 MMOL/L (ref 7–15)
AST SERPL W P-5'-P-CCNC: 25 U/L (ref 0–45)
BILIRUB SERPL-MCNC: 0.5 MG/DL
BUN SERPL-MCNC: 9.4 MG/DL (ref 6–20)
CALCIUM SERPL-MCNC: 9.4 MG/DL (ref 8.6–10)
CHLORIDE SERPL-SCNC: 102 MMOL/L (ref 98–107)
CHOLEST SERPL-MCNC: 123 MG/DL
CREAT SERPL-MCNC: 0.85 MG/DL (ref 0.67–1.17)
DEPRECATED HCO3 PLAS-SCNC: 26 MMOL/L (ref 22–29)
EGFRCR SERPLBLD CKD-EPI 2021: >90 ML/MIN/1.73M2
FASTING STATUS PATIENT QL REPORTED: YES
GLUCOSE SERPL-MCNC: 106 MG/DL (ref 70–99)
HDLC SERPL-MCNC: 26 MG/DL
LDLC SERPL CALC-MCNC: 57 MG/DL
NONHDLC SERPL-MCNC: 97 MG/DL
POTASSIUM SERPL-SCNC: 4.2 MMOL/L (ref 3.4–5.3)
PROT SERPL-MCNC: 7 G/DL (ref 6.4–8.3)
SODIUM SERPL-SCNC: 138 MMOL/L (ref 135–145)
TRIGL SERPL-MCNC: 201 MG/DL

## 2024-04-09 PROCEDURE — 82374 ASSAY BLOOD CARBON DIOXIDE: CPT | Mod: ZL

## 2024-04-09 PROCEDURE — 82040 ASSAY OF SERUM ALBUMIN: CPT | Mod: ZL

## 2024-04-09 PROCEDURE — 36415 COLL VENOUS BLD VENIPUNCTURE: CPT | Mod: ZL

## 2024-04-09 PROCEDURE — 80061 LIPID PANEL: CPT | Mod: ZL

## 2024-11-22 DIAGNOSIS — I25.118 CORONARY ARTERY DISEASE OF NATIVE HEART WITH STABLE ANGINA PECTORIS, UNSPECIFIED VESSEL OR LESION TYPE (H): ICD-10-CM

## 2024-11-25 RX ORDER — ATORVASTATIN CALCIUM 80 MG/1
80 TABLET, FILM COATED ORAL AT BEDTIME
Qty: 90 TABLET | Refills: 1 | Status: SHIPPED | OUTPATIENT
Start: 2024-11-25

## 2024-11-25 NOTE — TELEPHONE ENCOUNTER
"Requested Prescriptions   Pending Prescriptions Disp Refills    atorvastatin (LIPITOR) 80 MG tablet [Pharmacy Med Name: atorvastatin 80 mg tablet] 90 tablet 3     Sig: Take 1 tablet (80 mg) by mouth at bedtime       Antihyperlipidemic agents Passed - 11/25/2024  2:56 PM        Passed - LDL on file in the past 12 months        Passed - Medication is active on med list        Passed - Recent (12 mo) or future (90 days) visit within the authorizing provider's specialty     The patient must have completed an in-person or virtual visit within the past 12 months or has a future visit scheduled within the next 90 days with the authorizing provider s specialty.  Urgent care and e-visits do not qualify as an office visit for this protocol.        Passed - Patient is age 18 years or older     Last Written Prescription Date:  11/28/23  Last Fill Quantity: 90,   # refills: 3    Last Office Visit: 3/19/24 and note states: \"Continue on high intensity statin with LDL goal <70 recommended. Continue Lipitor 80 mg nightly and start Zetia 10 mg daily.\"    Future Office visit:   none    Prescription approved per [facility] Refill Protocol.   Lorri Richard RN ....................  11/25/2024   2:56 PM    "

## 2025-03-09 ENCOUNTER — HEALTH MAINTENANCE LETTER (OUTPATIENT)
Age: 55
End: 2025-03-09

## 2025-03-24 ENCOUNTER — OFFICE VISIT (OUTPATIENT)
Dept: CARDIOLOGY | Facility: OTHER | Age: 55
End: 2025-03-24
Attending: NURSE PRACTITIONER
Payer: COMMERCIAL

## 2025-03-24 VITALS
DIASTOLIC BLOOD PRESSURE: 80 MMHG | SYSTOLIC BLOOD PRESSURE: 110 MMHG | HEART RATE: 84 BPM | BODY MASS INDEX: 30.77 KG/M2 | HEIGHT: 72 IN | WEIGHT: 227.2 LBS | OXYGEN SATURATION: 95 % | TEMPERATURE: 98.1 F | RESPIRATION RATE: 18 BRPM

## 2025-03-24 DIAGNOSIS — E78.5 HYPERLIPIDEMIA LDL GOAL <70: ICD-10-CM

## 2025-03-24 DIAGNOSIS — Z95.5 S/P RIGHT CORONARY ARTERY (RCA) STENT PLACEMENT: Primary | ICD-10-CM

## 2025-03-24 DIAGNOSIS — K21.00 GASTROESOPHAGEAL REFLUX DISEASE WITH ESOPHAGITIS WITHOUT HEMORRHAGE: ICD-10-CM

## 2025-03-24 DIAGNOSIS — Z95.5 HISTORY OF CORONARY ARTERY STENT PLACEMENT: ICD-10-CM

## 2025-03-24 LAB
ATRIAL RATE - MUSE: 86 BPM
DIASTOLIC BLOOD PRESSURE - MUSE: NORMAL MMHG
INTERPRETATION ECG - MUSE: NORMAL
P AXIS - MUSE: 63 DEGREES
PR INTERVAL - MUSE: 128 MS
QRS DURATION - MUSE: 92 MS
QT - MUSE: 350 MS
QTC - MUSE: 418 MS
R AXIS - MUSE: 20 DEGREES
SYSTOLIC BLOOD PRESSURE - MUSE: NORMAL MMHG
T AXIS - MUSE: 50 DEGREES
VENTRICULAR RATE- MUSE: 86 BPM

## 2025-03-24 RX ORDER — ATORVASTATIN CALCIUM 80 MG/1
80 TABLET, FILM COATED ORAL AT BEDTIME
Qty: 90 TABLET | Refills: 4 | Status: SHIPPED | OUTPATIENT
Start: 2025-03-24

## 2025-03-24 RX ORDER — EZETIMIBE 10 MG/1
10 TABLET ORAL AT BEDTIME
Qty: 90 TABLET | Refills: 4 | Status: SHIPPED | OUTPATIENT
Start: 2025-03-24

## 2025-03-24 RX ORDER — FAMOTIDINE 20 MG/1
20 TABLET, FILM COATED ORAL 2 TIMES DAILY
Qty: 180 TABLET | Refills: 4 | Status: SHIPPED | OUTPATIENT
Start: 2025-03-24

## 2025-03-24 ASSESSMENT — PAIN SCALES - GENERAL: PAINLEVEL_OUTOF10: NO PAIN (0)

## 2025-03-24 NOTE — NURSING NOTE
"Chief Complaint   Patient presents with    Yearly Exam     Cardiac Care        Initial /80 (BP Location: Right arm, Patient Position: Sitting, Cuff Size: Adult Large)   Pulse 84   Temp 98.1  F (36.7  C) (Temporal)   Resp 18   Ht 1.816 m (5' 11.5\")   Wt 103.1 kg (227 lb 3.2 oz)   SpO2 95%   BMI 31.25 kg/m   Estimated body mass index is 31.25 kg/m  as calculated from the following:    Height as of this encounter: 1.816 m (5' 11.5\").    Weight as of this encounter: 103.1 kg (227 lb 3.2 oz).  Meds Reconciled: complete  Pt is on Aspirin  Pt is on a Statin  Pt is not on Xarelto or Eliquis  Pt is not on a Warfarin   PHQ and/or ANDREW reviewed. Pt referred to PCP/MH Provider as appropriate.    Shayy Zuñiga LPN         "

## 2025-03-24 NOTE — PATIENT INSTRUCTIONS
No medication changes at this time.   Continue to work on tobacco cessation.  Follow-up with cardiology in one year, sooner if needed.

## 2025-03-24 NOTE — PROGRESS NOTES
[facility] HEART CARE   CARDIOLOGY PROGRESS NOTE    Alvaro Gaitan   [address] [place] [address]    No Ref-Primary, Physician     Chief Complaint   Patient presents with    Yearly Exam     Cardiac Care         HPI:   Mr. Gaitan is a 54 year old male who presents for cardiology follow-up to visit on 1/9/2024 with known CAD, s/p cardiac cath with MANDA to RCA on 12/19/2023. Patient was previously followed by cardiology at [facility], last visit with Dr. Kruger on 4/5/2021.    Patient with history three prior coronary PCI's.  10/2004- Taxus MANDA mLAD stent ([facility])  Cadiac cath 2/13/2008- mLAD stent patent, MANDA to OM  NSTEMI in December 2018, cardiac cath revealing 99% RCA stenosis treated with synergy MANAD 3.5 x 24 mm, 50 to 60% in-stent restenosis of the mid LAD and 60 to 70% in-stent restenosis of his LCx.  Additionally noted stenosis in the LAD proximal to the stent of about 80 to 90%.  Recommended staged PCI completed on 2/12/2019-intervention to the LAD was unsuccessful with inability to cross the lesion. Repeat cath on 2/20/2019 he underwent PCI with laser arthrectomy and Xience 3.0 x 18 mm MANDA to the LAD lesion along with Xience 2.75 X 18 mm MANDA to the OM.     Additional PMH includes HTN, HLD, tobacco abuse (currently smoking 1ppd).    At his last visit in November, patient reported progressive fatigue and energy loss. Positive for exertional chest burning which is similar to anginal symptoms in the past. Chest discomfort resolved with rest.  He had experienced increased exertional dyspnea. No edema. Positive for episodes of lightheadedness with chest pain, no syncope events. No palpitations.     S/p cardiac cath on 12/19/2023 at [facility]:  Conclusion    RV Branch lesion is 99% stenosed.    Prox RCA lesion is 99% stenosed.    Ost RCA to Prox RCA lesion is 25% stenosed.    Ost Cx to Prox Cx lesion is 50% stenosed.    1st Mrg-1 lesion is 50% stenosed.    1st Mrg-2 lesion is 45% stenosed.    Mid LAD-1 lesion  is 45% stenosed.    Mid LAD-2 lesion is 50% stenosed.    1st Diag lesion is 60% stenosed.    2nd Diag lesion is 50% stenosed.    Successful PCI of the mid RCA (instent restenosis) MANDA  The RCA was the culprit (instent restenosis at the RV marginal).  The lesion was stented with a 3.5x16mm MANDA.  An RV marginal had 99% stenosis at the ostium (likely a prior stent pinch).  Could not wire it and it closed after stenting (without symptoms or ECG change).    The left coronary has multiple moderate lesions that could be stented in the future, if needed. It was clear that the RCA wa driving the symptoms.    INTERVAL HISTORY:  Today, patient reports feeling good. No recurrence of chest pain or pressure. No increased dyspnea. No palpitations. Orthostatic lightheadedness with hypotension resolved after going off Imdur. No recurrence of chest pain or anginal symptoms since off Imdur. No syncope. No edema. Continues to use tobacco,encouraged cessation.     PAST MEDICAL HISTORY:   Past Medical History:   Diagnosis Date    Atherosclerotic heart disease of native coronary artery without angina pectoris     2/4/2008,s/p stent    Glaucoma     No Comments Provided    Hyperlipidemia     2/4/2008    Male erectile dysfunction     No Comments Provided    Personal history of nicotine dependence     2/5/2008          FAMILY HISTORY:   Family History   Problem Relation Age of Onset    Heart Disease Mother         Heart Disease    Heart Disease Father         Heart Disease    Hyperlipidemia Mother         Hyperlipidemia    Hyperlipidemia Father         Hyperlipidemia          PAST SURGICAL HISTORY:   Past Surgical History:   Procedure Laterality Date    CV CORONARY ANGIOGRAM N/A 12/19/2023    Procedure: Coronary Angiogram;  Surgeon: Hermelindo Quinn MD;  Location: [facility] CARDIAC CATH LAB    CV PCI N/A 12/19/2023    Procedure: Percutaneous Coronary Intervention;  Surgeon: Hermelindo Quinn MD;  Location: [facility] CARDIAC CATH LAB     CV PCI ANGIOPLASTY N/A 12/19/2023    Procedure: Percutaneous Transluminal Angioplasty;  Surgeon: Hermelindo Quinn MD;  Location:  HEART CARDIAC CATH LAB    OTHER SURGICAL HISTORY      [id number],OTHER    OTHER SURGICAL HISTORY      [id number],OTHER,  screws          SOCIAL HISTORY:   Social History     Socioeconomic History    Marital status:    Tobacco Use    Smoking status: Every Day     Packs/day: 0.50     Years: 25.00     Additional pack years: 0.00     Total pack years: 12.50     Types: Cigarettes     Last attempt to quit: 2/13/2008     Years since quitting: 15.7    Smokeless tobacco: Never    Tobacco comments:     Quit smoking: Patient states that he smokes daily, but only one cigarettes   Vaping Use    Vaping Use: Never used   Substance and Sexual Activity    Alcohol use: Yes     Comment: 2 a week    Drug use: Not Currently     Types: Other     Comment: Drug use: No   Social History Narrative     work in [employer]  2 children          CURRENT MEDICATIONS:   Current Outpatient Medications   Medication Sig Dispense Refill    aspirin 81 MG EC tablet Take 1 tablet (81 mg) by mouth daily 90 tablet 3    atorvastatin (LIPITOR) 80 MG tablet Take 1 tablet (80 mg) by mouth at bedtime. 90 tablet 4    ezetimibe (ZETIA) 10 MG tablet Take 1 tablet (10 mg) by mouth at bedtime. 90 tablet 4    famotidine (PEPCID) 20 MG tablet Take 1 tablet (20 mg) by mouth 2 times daily. 180 tablet 4    ibuprofen (ADVIL/MOTRIN) 600 MG tablet Take 1 tablet (600 mg) by mouth every 6 hours as needed for moderate pain 30 tablet 0    nitroGLYcerin (NITROSTAT) 0.4 MG sublingual tablet Place 1 tablet (0.4 mg) under the tongue every 5 minutes as needed for chest pain 6 tablet 11    ticagrelor (BRILINTA) 90 MG tablet Take 1 tablet (90 mg) by mouth 2 times daily. 180 tablet 4     No current facility-administered medications for this visit.         ALLERGIES:   Allergies   Allergen Reactions    Shellfish-Derived Products  "Nausea and Vomiting          ROS:   CONSTITUTIONAL: No reported fever or chills. No changes in weight.  ENT: No visual disturbance, ear ache, epistaxis or sore throat.   CARDIOVASCULAR: No recurrence of chest pain or pressure. No palpitations or lower extremity edema.   RESPIRATORY: Chronic dyspnea without change. No cough, wheezing or hemoptysis.   GI: No reported abdominal pain. Positive for GERD.   : No reported hematuria or dysuria.   NEUROLOGICAL: No significant lightheadedness, dizziness, syncope, ataxia, paresthesias or weakness.   HEMATOLOGIC: No history of anemia. No bleeding or excessive bruising. No history of blood clots.   MUSCULOSKELETAL: No new joint pain or swelling, no muscle pain.  ENDOCRINOLOGIC: No temperature intolerance. No hair or skin changes.  SKIN: No abnormal rashes or sores, no unusual itching.  PSYCHIATRIC: No history of depression or anxiety. No changes in mood, feeling down or anxious.       PHYSICAL EXAM:   /80 (BP Location: Right arm, Patient Position: Sitting, Cuff Size: Adult Large)   Pulse 84   Temp 98.1  F (36.7  C) (Temporal)   Resp 18   Ht 1.816 m (5' 11.5\")   Wt 103.1 kg (227 lb 3.2 oz)   SpO2 95%   BMI 31.25 kg/m    GENERAL: The patient is a well-developed, well-nourished, in no apparent distress.  HEENT: Head is normocephalic and atraumatic. Eyes are symmetrical with normal visual tracking. No icterus, no xanthelasmas. Nares appeared normal without nasal drainage. Mucous membranes are moist, no cyanosis.  NECK: Supple, JVP not visible.   CHEST/ LUNGS: Lungs clear to auscultation, no rales, rhonchi or wheezes, no use of accessory muscles, no retractions, respirations unlabored and normal respiratory rate.   CARDIO: Regular rate and rhythm normal with S1 and S2, no S3 or S4 and no murmur, click or rub.   ABD: Abdomen is nondistended.   EXTREMITIES: No edema present.   MUSCULOSKELETAL: No  visible joint swelling.   NEUROLOGIC: Alert and oriented X3. Normal " speech, gait and affect. No focal neurologic deficits.   SKIN: No jaundice. No rashes or visible skin lesions present. No ecchymosis. Right radial access site healed well.     EKG: NSR, rate 86 bpm, no ST-T changes    LAB RESULTS:   Lab on 04/09/2024   Component Date Value Ref Range Status    Cholesterol 04/09/2024 123  <200 mg/dL Final    Triglycerides 04/09/2024 201 (H)  <150 mg/dL Final    Direct Measure HDL 04/09/2024 26 (L)  >=40 mg/dL Final    LDL Cholesterol Calculated 04/09/2024 57  <=100 mg/dL Final    Non HDL Cholesterol 04/09/2024 97  <130 mg/dL Final    Patient Fasting > 8hrs? 04/09/2024 Yes   Final    Sodium 04/09/2024 138  135 - 145 mmol/L Final    Reference intervals for this test were updated on 09/26/2023 to more accurately reflect our healthy population. There may be differences in the flagging of prior results with similar values performed with this method. Interpretation of those prior results can be made in the context of the updated reference intervals.     Potassium 04/09/2024 4.2  3.4 - 5.3 mmol/L Final    Carbon Dioxide (CO2) 04/09/2024 26  22 - 29 mmol/L Final    Anion Gap 04/09/2024 10  7 - 15 mmol/L Final    Urea Nitrogen 04/09/2024 9.4  6.0 - 20.0 mg/dL Final    Creatinine 04/09/2024 0.85  0.67 - 1.17 mg/dL Final    GFR Estimate 04/09/2024 >90  >60 mL/min/1.73m2 Final    Calcium 04/09/2024 9.4  8.6 - 10.0 mg/dL Final    Chloride 04/09/2024 102  98 - 107 mmol/L Final    Glucose 04/09/2024 106 (H)  70 - 99 mg/dL Final    Alkaline Phosphatase 04/09/2024 95  40 - 150 U/L Final    Reference intervals for this test were updated on 11/14/2023 to more accurately reflect our healthy population. There may be differences in the flagging of prior results with similar values performed with this method. Interpretation of those prior results can be made in the context of the updated reference intervals.    AST 04/09/2024 25  0 - 45 U/L Final    Reference intervals for this test were updated on  6/12/2023 to more accurately reflect our healthy population. There may be differences in the flagging of prior results with similar values performed with this method. Interpretation of those prior results can be made in the context of the updated reference intervals.    ALT 04/09/2024 34  0 - 70 U/L Final    Reference intervals for this test were updated on 6/12/2023 to more accurately reflect our healthy population. There may be differences in the flagging of prior results with similar values performed with this method. Interpretation of those prior results can be made in the context of the updated reference intervals.      Protein Total 04/09/2024 7.0  6.4 - 8.3 g/dL Final    Albumin 04/09/2024 4.5  3.5 - 5.2 g/dL Final    Bilirubin Total 04/09/2024 0.5  <=1.2 mg/dL Final             ASSESSMENT:   Alvaro Gaitan presents for cardiology follow-up to visit on 1/9/2024 with known CAD, s/p cardiac cath with MANDA to RCA on 12/19/2023. Patient was previously followed by cardiology at [facility], last visit with Dr. Kruger on 4/5/2021.  Today, patient reports feeling good. No recurrence of chest pain or pressure. No increased dyspnea. No palpitations. Orthostatic lightheadedness with hypotension resolved after going off Imdur. No recurrence of chest pain or anginal symptoms since off Imdur. No syncope. No edema. Continues to use tobacco,encouraged cessation.     1. S/P right coronary artery (RCA) stent placement (12/19/2023) (Primary)  2. History of coronary artery stent placement-MANDA mLAD (2004), MANDA mLAD (2008), MANDA RCA (2018), MANDA LAD and OM (2019)  3. Hyperlipidemia LDL goal <70    PLAN:   1.   S/p coronary angiography at [facility] on 12/19/2023 revealing RCA culprit (instent restenosis at the RV marginal), lesion was stented with a 3.5x16mm MANDA.  An RV marginal had 99% stenosis at the ostium (likely a prior stent pinch).  Could not wire it and it closed after stenting (without symptoms or ECG change).  The left  coronary has multiple moderate lesions that could be stented in the future, if needed. It was clear that the RCA wa driving the symptoms.  2.  He will continue on DAPT- ASA 81 mg daily and Brilinta 90 mg BID. He will continue ongoing DAPT with complex CAD with history of in-stent restenosis. He is tolerating without complications.  3.  Continue on high intensity statin with LDL goal <70 recommended. Continue Lipitor 80 mg nightly and start Zetia 10 mg daily.   4. BP remains well controlled.   5. Pepcid for GERD and gastric protection.   6. Strongly encouraged tobacco cessation.     Orders Placed This Encounter   Procedures    EKG 12-lead, tracing only (Same Day)       Thank you for allowing me to participate in the care of your patient. Please do not hesitate to contact me if you have any questions.     Gilda Flores, APRN CNP [facility]

## (undated) DEVICE — FASTENER CATH BALLOON CLAMPX2 STATLOCK 0684-00-493

## (undated) DEVICE — SLEEVE TR BAND RADIAL COMPRESSION DEVICE 24CM TRB24-REG

## (undated) DEVICE — KIT DVC ANGIO IBASIXCOMPAK 13INX20ML 3WAY IN4430

## (undated) DEVICE — CATH BALLOON EMERGE 3.0X15MM H7493918915300

## (undated) DEVICE — CATH GUIDING BLUE YELLOW PTFE JR4 6FRX100CM 67008200

## (undated) DEVICE — CATH BALLOON NC EMERGE 4.00X15MM H7493926715400

## (undated) DEVICE — KIT HAND CONTROL ACIST 016795

## (undated) DEVICE — Device

## (undated) DEVICE — GW VASC .035IN DIA 260CML 7CML 3 MM RADIUS J CURVE 502455

## (undated) DEVICE — GUIDEWIRE VASC 0.014INX180CM RUNTHROUGH 25-1011

## (undated) DEVICE — VALVE HEMOSTASIS .115" DUOSTAT ADAPTER 23245

## (undated) DEVICE — SHTH INTRO 0.021IN ID 6FR DIA

## (undated) DEVICE — MANIFOLD KIT ANGIO AUTOMATED 014613

## (undated) DEVICE — WIRE GUIDE HI-TRQ PILOT 50 JTIP 0.014"X190CM 1010480-HJ

## (undated) DEVICE — TUBING PRESSURE 30"

## (undated) DEVICE — PACK HEART LEFT CUSTOM

## (undated) RX ORDER — ASPIRIN 81 MG/1
TABLET, CHEWABLE ORAL
Status: DISPENSED
Start: 2023-12-19

## (undated) RX ORDER — FENTANYL CITRATE 50 UG/ML
INJECTION, SOLUTION INTRAMUSCULAR; INTRAVENOUS
Status: DISPENSED
Start: 2023-12-19

## (undated) RX ORDER — NICARDIPINE HCL-0.9% SOD CHLOR 1 MG/10 ML
SYRINGE (ML) INTRAVENOUS
Status: DISPENSED
Start: 2023-12-19

## (undated) RX ORDER — ACETAMINOPHEN 500 MG
TABLET ORAL
Status: DISPENSED
Start: 2022-06-29

## (undated) RX ORDER — HEPARIN SODIUM 1000 [USP'U]/ML
INJECTION, SOLUTION INTRAVENOUS; SUBCUTANEOUS
Status: DISPENSED
Start: 2023-12-19

## (undated) RX ORDER — SODIUM CHLORIDE 9 MG/ML
INJECTION, SOLUTION INTRAVENOUS
Status: DISPENSED
Start: 2023-12-19

## (undated) RX ORDER — NITROGLYCERIN 5 MG/ML
VIAL (ML) INTRAVENOUS
Status: DISPENSED
Start: 2023-12-19

## (undated) RX ORDER — DOXYCYCLINE 100 MG/10ML
INJECTION, POWDER, LYOPHILIZED, FOR SOLUTION INTRAVENOUS
Status: DISPENSED
Start: 2022-06-29